# Patient Record
Sex: MALE | URBAN - METROPOLITAN AREA
[De-identification: names, ages, dates, MRNs, and addresses within clinical notes are randomized per-mention and may not be internally consistent; named-entity substitution may affect disease eponyms.]

---

## 2020-11-20 ENCOUNTER — APPOINTMENT (RX ONLY)
Dept: URBAN - METROPOLITAN AREA CLINIC 153 | Facility: CLINIC | Age: 50
Setting detail: DERMATOLOGY
End: 2020-11-20

## 2020-11-20 DIAGNOSIS — L95.8 OTHER VASCULITIS LIMITED TO THE SKIN: ICD-10-CM

## 2020-11-20 PROBLEM — L30.9 DERMATITIS, UNSPECIFIED: Status: ACTIVE | Noted: 2020-11-20

## 2020-11-20 PROCEDURE — ? ORDER TESTS

## 2020-11-20 PROCEDURE — ? PRESCRIPTION

## 2020-11-20 PROCEDURE — 11102 TANGNTL BX SKIN SINGLE LES: CPT

## 2020-11-20 PROCEDURE — ? BIOPSY BY SHAVE METHOD

## 2020-11-20 PROCEDURE — 11103 TANGNTL BX SKIN EA SEP/ADDL: CPT

## 2020-11-20 RX ORDER — PREDNISONE 20 MG/1
TABLET ORAL
Qty: 21 | Refills: 0 | Status: ERX | COMMUNITY
Start: 2020-11-20

## 2020-11-20 RX ADMIN — PREDNISONE: 20 TABLET ORAL at 00:00

## 2020-11-20 ASSESSMENT — LOCATION SIMPLE DESCRIPTION DERM
LOCATION SIMPLE: LEFT HAND
LOCATION SIMPLE: RIGHT HAND

## 2020-11-20 ASSESSMENT — LOCATION DETAILED DESCRIPTION DERM
LOCATION DETAILED: RIGHT RADIAL DORSAL HAND
LOCATION DETAILED: LEFT ULNAR DORSAL HAND

## 2020-11-20 ASSESSMENT — LOCATION ZONE DERM: LOCATION ZONE: HAND

## 2020-11-20 NOTE — PROCEDURE: BIOPSY BY SHAVE METHOD
Hide Additional Size Dimension?: No
Biopsy Type: DIF
Type Of Destruction Used: Curettage
X Size Of Lesion In Cm: 0
Information: Selecting Yes will display possible errors in your note based on the variables you have selected. This validation is only offered as a suggestion for you. PLEASE NOTE THAT THE VALIDATION TEXT WILL BE REMOVED WHEN YOU FINALIZE YOUR NOTE. IF YOU WANT TO FAX A PRELIMINARY NOTE YOU WILL NEED TO TOGGLE THIS TO 'NO' IF YOU DO NOT WANT IT IN YOUR FAXED NOTE.
Electrodesiccation And Curettage Text: The wound bed was treated with electrodesiccation and curettage after the biopsy was performed.
Silver Nitrate Text: The wound bed was treated with silver nitrate after the biopsy was performed.
Billing Type: Third-Party Bill
Cryotherapy Text: The wound bed was treated with cryotherapy after the biopsy was performed.
Biopsy Method: Dermablade
Anesthesia Volume In Cc: 0.5
Hemostasis: Ferric chloride
Depth Of Biopsy: dermis
Wound Care: Vaseline
Electrodesiccation Text: The wound bed was treated with electrodesiccation after the biopsy was performed.
Anesthesia Type: 1% lidocaine with epinephrine and a 1:10 solution of 8.4% sodium bicarbonate
Curettage Text: The wound bed was treated with curettage after the biopsy was performed.
Notification Instructions: Patient will be notified of biopsy results. However, patient instructed to call the office if not contacted within 2 weeks.
Post-Care Instructions: I reviewed with the patient in detail post-care instructions. Patient is to keep the biopsy site dry overnight, and then apply bacitracin twice daily until healed. Patient may apply hydrogen peroxide soaks to remove any crusting.
Consent: Written consent was obtained and risks were reviewed including but not limited to scarring, infection, bleeding, scabbing, incomplete removal, nerve damage and allergy to anesthesia.
Was A Bandage Applied: Yes
Dressing: bandage
Detail Level: Detailed
Biopsy Type: H and E

## 2020-11-30 ENCOUNTER — APPOINTMENT (RX ONLY)
Dept: URBAN - METROPOLITAN AREA CLINIC 153 | Facility: CLINIC | Age: 50
Setting detail: DERMATOLOGY
End: 2020-11-30

## 2020-11-30 VITALS — TEMPERATURE: 97.3 F

## 2020-11-30 DIAGNOSIS — L95.8 OTHER VASCULITIS LIMITED TO THE SKIN: ICD-10-CM

## 2020-11-30 PROCEDURE — ? ORDER TESTS

## 2020-11-30 PROCEDURE — ? ADDITIONAL NOTES

## 2020-11-30 PROCEDURE — ? PRESCRIPTION

## 2020-11-30 PROCEDURE — 99214 OFFICE O/P EST MOD 30 MIN: CPT

## 2020-11-30 PROCEDURE — ? COUNSELING

## 2020-11-30 RX ORDER — DAPSONE 25 MG/1
TABLET ORAL
Qty: 60 | Refills: 0 | COMMUNITY
Start: 2020-11-30

## 2020-11-30 RX ORDER — PREDNISONE 20 MG/1
TABLET ORAL
Qty: 14 | Refills: 0

## 2020-11-30 RX ADMIN — DAPSONE: 25 TABLET ORAL at 00:00

## 2020-11-30 ASSESSMENT — LOCATION DETAILED DESCRIPTION DERM
LOCATION DETAILED: LEFT RADIAL DORSAL HAND
LOCATION DETAILED: RIGHT ULNAR DORSAL HAND

## 2020-11-30 ASSESSMENT — LOCATION ZONE DERM: LOCATION ZONE: HAND

## 2020-11-30 ASSESSMENT — LOCATION SIMPLE DESCRIPTION DERM
LOCATION SIMPLE: RIGHT HAND
LOCATION SIMPLE: LEFT HAND

## 2020-11-30 NOTE — PROCEDURE: ADDITIONAL NOTES
Additional Notes: Pt will continue prednisone 40mg AM.\\nPt will start dapsone 50mg BID until office calls him with bloodwork results.
Detail Level: Simple

## 2020-12-07 ENCOUNTER — APPOINTMENT (RX ONLY)
Dept: URBAN - METROPOLITAN AREA CLINIC 153 | Facility: CLINIC | Age: 50
Setting detail: DERMATOLOGY
End: 2020-12-07

## 2020-12-07 VITALS — TEMPERATURE: 97.1 F

## 2020-12-07 DIAGNOSIS — L95.8 OTHER VASCULITIS LIMITED TO THE SKIN: ICD-10-CM

## 2020-12-07 PROCEDURE — ? ORDER TESTS

## 2020-12-07 PROCEDURE — ? ADDITIONAL NOTES

## 2020-12-07 PROCEDURE — ? COUNSELING

## 2020-12-07 PROCEDURE — 99212 OFFICE O/P EST SF 10 MIN: CPT

## 2020-12-07 PROCEDURE — ? PRESCRIPTION

## 2020-12-07 RX ORDER — PREDNISONE 20 MG/1
TABLET ORAL
Qty: 20 | Refills: 0 | Status: ERX

## 2020-12-07 ASSESSMENT — LOCATION DETAILED DESCRIPTION DERM
LOCATION DETAILED: RIGHT ULNAR DORSAL HAND
LOCATION DETAILED: LEFT RADIAL DORSAL HAND

## 2020-12-07 ASSESSMENT — LOCATION SIMPLE DESCRIPTION DERM
LOCATION SIMPLE: RIGHT HAND
LOCATION SIMPLE: LEFT HAND

## 2020-12-07 ASSESSMENT — LOCATION ZONE DERM: LOCATION ZONE: HAND

## 2020-12-14 ENCOUNTER — APPOINTMENT (RX ONLY)
Dept: URBAN - METROPOLITAN AREA CLINIC 153 | Facility: CLINIC | Age: 50
Setting detail: DERMATOLOGY
End: 2020-12-14

## 2020-12-14 ENCOUNTER — RX ONLY (OUTPATIENT)
Age: 50
Setting detail: RX ONLY
End: 2020-12-14

## 2020-12-14 VITALS — TEMPERATURE: 97.7 F

## 2020-12-14 DIAGNOSIS — L95.8 OTHER VASCULITIS LIMITED TO THE SKIN: ICD-10-CM

## 2020-12-14 PROCEDURE — 99213 OFFICE O/P EST LOW 20 MIN: CPT

## 2020-12-14 PROCEDURE — ? COUNSELING

## 2020-12-14 PROCEDURE — ? ADDITIONAL NOTES

## 2020-12-14 RX ORDER — PREDNISONE 20 MG/1
TABLET ORAL
Qty: 20 | Refills: 0 | Status: ERX

## 2020-12-14 ASSESSMENT — LOCATION SIMPLE DESCRIPTION DERM
LOCATION SIMPLE: LEFT PRETIBIAL REGION
LOCATION SIMPLE: RIGHT PRETIBIAL REGION

## 2020-12-14 ASSESSMENT — LOCATION DETAILED DESCRIPTION DERM
LOCATION DETAILED: LEFT DISTAL PRETIBIAL REGION
LOCATION DETAILED: RIGHT DISTAL PRETIBIAL REGION

## 2020-12-14 ASSESSMENT — LOCATION ZONE DERM: LOCATION ZONE: LEG

## 2020-12-14 NOTE — PROCEDURE: ADDITIONAL NOTES
Detail Level: Simple
Additional Notes: Patient was provided with all the paper work and a referral for a nephrologist to make a appointment.\\nPatient was recommended to continue taking the prescribed tablets as instructed.

## 2021-11-24 ENCOUNTER — OFFICE VISIT (OUTPATIENT)
Dept: URBAN - METROPOLITAN AREA CLINIC 89 | Facility: CLINIC | Age: 51
End: 2021-11-24
Payer: COMMERCIAL

## 2021-11-24 DIAGNOSIS — H40.023 OPEN ANGLE WITH BORDERLINE FINDINGS, HIGH RISK, BILATERAL: Primary | ICD-10-CM

## 2021-11-24 PROCEDURE — 92083 EXTENDED VISUAL FIELD XM: CPT | Performed by: OPTOMETRIST

## 2021-11-24 PROCEDURE — 92012 INTRM OPH EXAM EST PATIENT: CPT | Performed by: OPTOMETRIST

## 2021-11-24 PROCEDURE — 76514 ECHO EXAM OF EYE THICKNESS: CPT | Performed by: OPTOMETRIST

## 2021-11-24 PROCEDURE — 92133 CPTRZD OPH DX IMG PST SGM ON: CPT | Performed by: OPTOMETRIST

## 2021-11-24 RX ORDER — ORAL SEMAGLUTIDE 14 MG/1
14 MG TABLET ORAL AS DIRECTED
Refills: 0 | Status: ACTIVE
Start: 2021-11-24

## 2021-11-24 RX ORDER — OMEPRAZOLE 20 MG/1
20 MG TABLET, DELAYED RELEASE ORAL
Refills: 0 | Status: ACTIVE
Start: 2021-11-24

## 2021-11-24 ASSESSMENT — INTRAOCULAR PRESSURE
OS: 15
OD: 15

## 2021-11-24 NOTE — IMPRESSION/PLAN
Impression: Open angle with borderline findings, high risk, bilateral: H40.023. Plan: Visual field, OCT Nerve and pachymetry performed and reviewed today. Consistent with high risk glaucoma suspect. Right eye shows inferior thinning of the optic nerve. 

Plan to repeat RNFL testing at annual dilated DM examination in July 2022

## 2022-01-21 ENCOUNTER — OFFICE VISIT (OUTPATIENT)
Dept: URBAN - METROPOLITAN AREA CLINIC 89 | Facility: CLINIC | Age: 52
End: 2022-01-21
Payer: COMMERCIAL

## 2022-01-21 DIAGNOSIS — H11.153 PINGUECULA, BILATERAL: ICD-10-CM

## 2022-01-21 DIAGNOSIS — H52.13 MYOPIA, BILATERAL: ICD-10-CM

## 2022-01-21 DIAGNOSIS — H25.13 AGE-RELATED NUCLEAR CATARACT, BILATERAL: ICD-10-CM

## 2022-01-21 DIAGNOSIS — E11.3293 DIABETES MELLITUS TYPE 2 WITH MILD NON-PROLIFERATIVE RETINOPATHY WITHOUT MACULAR EDEMA, BILATERAL: Primary | ICD-10-CM

## 2022-01-21 ASSESSMENT — INTRAOCULAR PRESSURE
OS: 15
OD: 15

## 2022-01-21 NOTE — IMPRESSION/PLAN
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 Plan: Diabetes mellitus with mild nonproliferative diabetic retinopathy. Discussed that DM and diabetic retinopathy are the leading cause of preventable vision loss in American adults. Stressed the need for proper blood sugar control and correlation of A1c and diabetic eye disease. RTC 1 year repeat dilated examination.

## 2022-08-21 ENCOUNTER — EMERGENCY (EMERGENCY)
Facility: HOSPITAL | Age: 52
LOS: 1 days | Discharge: HOME | End: 2022-08-21
Attending: EMERGENCY MEDICINE | Admitting: EMERGENCY MEDICINE

## 2022-08-21 VITALS
SYSTOLIC BLOOD PRESSURE: 100 MMHG | HEART RATE: 100 BPM | TEMPERATURE: 97 F | DIASTOLIC BLOOD PRESSURE: 64 MMHG | OXYGEN SATURATION: 95 % | RESPIRATION RATE: 17 BRPM

## 2022-08-21 VITALS
TEMPERATURE: 97 F | RESPIRATION RATE: 18 BRPM | HEART RATE: 111 BPM | SYSTOLIC BLOOD PRESSURE: 112 MMHG | OXYGEN SATURATION: 95 % | DIASTOLIC BLOOD PRESSURE: 54 MMHG

## 2022-08-21 PROCEDURE — 70450 CT HEAD/BRAIN W/O DYE: CPT | Mod: 26,MA

## 2022-08-21 PROCEDURE — 72125 CT NECK SPINE W/O DYE: CPT | Mod: 26,MA

## 2022-08-21 PROCEDURE — 99285 EMERGENCY DEPT VISIT HI MDM: CPT

## 2022-08-21 NOTE — ED PROVIDER NOTE - PATIENT PORTAL LINK FT
You can access the FollowMyHealth Patient Portal offered by [facility] by registering at the following website: [email or web address]. By joining [facility]’s FollowMyHealth portal, you will also be able to view your health information using other applications (apps) compatible with our system.

## 2022-08-21 NOTE — ED ADULT NURSE NOTE - OBJECTIVE STATEMENT
pt admits to drinking "too much".  not awake and alert. speaking freely in full sentences.  has abrasions to knees and side of the head.  seen by dr. hatfield. for cat scan and dispo.  mother at beside.

## 2022-08-21 NOTE — ED PROVIDER NOTE - OBJECTIVE STATEMENT
52 y/o M BIB EMS for intoxication.  Per EMS, pt found lying on the ground outside his house naked.  Noted to have superficial abrasions to bilateral knees and R forehead.  No reported LOC.  Pt endorses drinking a lot at a party tonight.  He does not recall anything else.  Per mother at bedside, she drove them home after the party, but the pt fell asleep in the car so she left him in the car when they got home.  After going inside, mother was then woken up by EMS when they came to get the pt.  Unknown who called 911.  No antiplatelet or anticoagulant use.  Pt with no complaints at this time.

## 2022-08-21 NOTE — ED PROVIDER NOTE - PROGRESS NOTE DETAILS
Duglas WILSON: Pt is clinically sober and mother is at bedside, will drive them home.  CT neg for traumatic injury.  Will dc home.

## 2022-08-21 NOTE — ED ADULT TRIAGE NOTE - CHIEF COMPLAINT QUOTE
intox    pt admits to having "a lot to drink". was driven home by mother who couldn't get him out of the car.  was found on street next to car [place].  has abrasions to knees.  denies pain.  pt awake and alert. denies pmhx

## 2022-08-21 NOTE — ED PROVIDER NOTE - NSFOLLOWUPINSTRUCTIONS_ED_ALL_ED_FT
Drink plenty of fluids.  You can take ibuprofen 600mg every 6 hours or Tylenol 650mg every 4 hours as needed for pain or fever.  Follow-up with your PMD as needed.  Return to the emergency department for any new or worsening symptoms.

## 2022-08-21 NOTE — ED PROVIDER NOTE - TOBACCO USE
Airway  Performed by: Clay Mccormack MD  Authorized by: Clay Mccormack MD     Final Airway Type:  Supraglottic airway  Final Supraglottic Airway:  Air-Q  SGA Size*:  4.5  Airway Seal Pressure (cm H2O):  20  Attempts*:  1   Patient Identified, Procedure confirmed, Emergency equipment available and Safety protocols followed  Location:  OR  Urgency:  Elective  Difficult Airway: No    Indications for Airway Management:  Anesthesia  Spontaneous Ventilation: absent    Sedation Level:  Anesthetized  Mask Difficulty Assessment:  0 - not attempted  Performed By:  Anesthesiologist  Anesthesiologist:  Clay Mccormack MD        
Nerve Block    Date/Time: 3/25/2021 12:05 PM  Performed by: Clay Mccormack MD  Authorized by: Clay Mccormack MD     Block Type :  Femoral adductor canal  Laterality:  Right  Patient Location:  Pre-op  Indication: post-op pain management at surgeon's request    Surgeon:  Dr. Steele  patient identified, IV checked, risks and benefits discussed, surgical consent, monitors and equipment checked, pre-op evaluation and timeout performed    Patient Position:  Supine  Prep:  Chlorhexidine gluconate (CHG)  Max Sterile Barrier Technique:  Hand Washing, Cap/Mask and Sterile gloves  Monitoring:  Continuous pulse oximetry, EKG and blood pressure  Injection Technique:  Single-shot  Procedures: ultrasound guided    Needle Gauge:  22 G  Needle Length:  8 cm  Needle Localization:  Ultrasound guidance and anatomical landmarks   in-plane  Physical status during block:  Sedated  Injection Assessment:  No paresthesia on injection, no resistance to injection, negative aspiration for heme, incremental injection and local visualized surrounding nerve on ultrasound  Patient Condition:  Tolerated well, no immediate complications  Paresthesia Pain:  None  Heart Rate Change: No    Slowly Injected: Yes    Performed By:  Anesthesiologist  Anesthesiologist:  Clay Mccormack MD  Start Time:  3/25/2021 12:00 PM   Right adductor canal block for post-operative pain control.  After risks/benefits discussed, block was done without issue.  Good spread of local anesthetic surrounding saphenous nerve and ultrasound images saved.            
Never smoker

## 2022-08-21 NOTE — ED PROVIDER NOTE - CLINICAL SUMMARY MEDICAL DECISION MAKING FREE TEXT BOX
50 y/o M intoxicated with unwitnessed fall.  Superficial abrasions noted, no active complaints.  Will obtain CT head given trauma and unwitnessed fall in an intoxicated pt, reassess for sobriety.

## 2023-06-02 ENCOUNTER — OFFICE VISIT (OUTPATIENT)
Dept: URBAN - METROPOLITAN AREA CLINIC 89 | Facility: CLINIC | Age: 53
End: 2023-06-02
Payer: COMMERCIAL

## 2023-06-02 DIAGNOSIS — H40.023 OPEN ANGLE WITH BORDERLINE FINDINGS, HIGH RISK, BILATERAL: ICD-10-CM

## 2023-06-02 DIAGNOSIS — H25.13 AGE-RELATED NUCLEAR CATARACT, BILATERAL: ICD-10-CM

## 2023-06-02 DIAGNOSIS — E11.3293 DIABETES MELLITUS TYPE 2 WITH MILD NON-PROLIFERATIVE RETINOPATHY WITHOUT MACULAR EDEMA, BILATERAL: Primary | ICD-10-CM

## 2023-06-02 DIAGNOSIS — H52.13 MYOPIA, BILATERAL: ICD-10-CM

## 2023-06-02 PROCEDURE — 92014 COMPRE OPH EXAM EST PT 1/>: CPT | Performed by: OPTOMETRIST

## 2023-06-02 PROCEDURE — 92250 FUNDUS PHOTOGRAPHY W/I&R: CPT | Performed by: OPTOMETRIST

## 2023-06-02 ASSESSMENT — INTRAOCULAR PRESSURE
OS: 14
OD: 16

## 2023-06-02 NOTE — IMPRESSION/PLAN
Impression: Diabetes mellitus Type 2 with mild non-proliferative retinopathy without macular edema, bilateral: Q16.7692. Plan: Baseline photos taken and reviewed. Consistent with Diabetes mellitus with moderate nonproliferative diabetic retinopathy. Discussed that DM and diabetic retinopathy are the leading cause of preventable vision loss in American adults. Stressed the need for proper blood sugar control and correlation of A1c and diabetic eye disease. RTC 1 year repeat dilated examination.

## 2023-06-02 NOTE — IMPRESSION/PLAN
Impression: Open angle with borderline findings, high risk, bilateral: H40.023. Plan: Optic nerve photos taken and reviewed. Monitor annually.

## 2024-01-19 ENCOUNTER — INPATIENT (INPATIENT)
Facility: HOSPITAL | Age: 54
LOS: 3 days | Discharge: HOME | DRG: 433 | End: 2024-01-23
Attending: STUDENT IN AN ORGANIZED HEALTH CARE EDUCATION/TRAINING PROGRAM | Admitting: STUDENT IN AN ORGANIZED HEALTH CARE EDUCATION/TRAINING PROGRAM
Payer: COMMERCIAL

## 2024-01-19 VITALS
HEART RATE: 120 BPM | TEMPERATURE: 99 F | OXYGEN SATURATION: 94 % | HEIGHT: 74 IN | DIASTOLIC BLOOD PRESSURE: 68 MMHG | RESPIRATION RATE: 18 BRPM | WEIGHT: 205.03 LBS | SYSTOLIC BLOOD PRESSURE: 100 MMHG

## 2024-01-19 DIAGNOSIS — F10.239 ALCOHOL DEPENDENCE WITH WITHDRAWAL, UNSPECIFIED: ICD-10-CM

## 2024-01-19 DIAGNOSIS — Z29.9 ENCOUNTER FOR PROPHYLACTIC MEASURES, UNSPECIFIED: ICD-10-CM

## 2024-01-19 DIAGNOSIS — E87.8 OTHER DISORDERS OF ELECTROLYTE AND FLUID BALANCE, NOT ELSEWHERE CLASSIFIED: ICD-10-CM

## 2024-01-19 DIAGNOSIS — R00.0 TACHYCARDIA, UNSPECIFIED: ICD-10-CM

## 2024-01-19 PROBLEM — Z78.9 OTHER SPECIFIED HEALTH STATUS: Chronic | Status: ACTIVE | Noted: 2022-08-21

## 2024-01-19 LAB
ALBUMIN SERPL ELPH-MCNC: 3.7 G/DL — SIGNIFICANT CHANGE UP (ref 3.3–5)
ALP SERPL-CCNC: 95 U/L — SIGNIFICANT CHANGE UP (ref 40–120)
ALT FLD-CCNC: 28 U/L — SIGNIFICANT CHANGE UP (ref 10–45)
AMMONIA BLD-MCNC: 36 UMOL/L — SIGNIFICANT CHANGE UP (ref 11–55)
ANION GAP SERPL CALC-SCNC: 13 MMOL/L — SIGNIFICANT CHANGE UP (ref 5–17)
ANION GAP SERPL CALC-SCNC: 16 MMOL/L — SIGNIFICANT CHANGE UP (ref 5–17)
APTT BLD: 44.4 SEC — HIGH (ref 24.5–35.6)
AST SERPL-CCNC: 150 U/L — HIGH (ref 10–40)
BASE EXCESS BLDV CALC-SCNC: 1.5 MMOL/L — SIGNIFICANT CHANGE UP (ref -2–3)
BASOPHILS # BLD AUTO: 0 K/UL — SIGNIFICANT CHANGE UP (ref 0–0.2)
BASOPHILS NFR BLD AUTO: 0 % — SIGNIFICANT CHANGE UP (ref 0–2)
BILIRUB SERPL-MCNC: 4.5 MG/DL — HIGH (ref 0.2–1.2)
BUN SERPL-MCNC: 8 MG/DL — SIGNIFICANT CHANGE UP (ref 7–23)
BUN SERPL-MCNC: 9 MG/DL — SIGNIFICANT CHANGE UP (ref 7–23)
CA-I SERPL-SCNC: 0.99 MMOL/L — LOW (ref 1.15–1.33)
CALCIUM SERPL-MCNC: 8.2 MG/DL — LOW (ref 8.4–10.5)
CALCIUM SERPL-MCNC: 8.8 MG/DL — SIGNIFICANT CHANGE UP (ref 8.4–10.5)
CHLORIDE BLDV-SCNC: 99 MMOL/L — SIGNIFICANT CHANGE UP (ref 96–108)
CHLORIDE SERPL-SCNC: 100 MMOL/L — SIGNIFICANT CHANGE UP (ref 96–108)
CHLORIDE SERPL-SCNC: 97 MMOL/L — SIGNIFICANT CHANGE UP (ref 96–108)
CO2 BLDV-SCNC: 26 MMOL/L — SIGNIFICANT CHANGE UP (ref 22–26)
CO2 SERPL-SCNC: 21 MMOL/L — LOW (ref 22–31)
CO2 SERPL-SCNC: 23 MMOL/L — SIGNIFICANT CHANGE UP (ref 22–31)
CREAT SERPL-MCNC: 0.64 MG/DL — SIGNIFICANT CHANGE UP (ref 0.5–1.3)
CREAT SERPL-MCNC: 0.69 MG/DL — SIGNIFICANT CHANGE UP (ref 0.5–1.3)
EGFR: 111 ML/MIN/1.73M2 — SIGNIFICANT CHANGE UP
EGFR: 113 ML/MIN/1.73M2 — SIGNIFICANT CHANGE UP
EOSINOPHIL # BLD AUTO: 0.18 K/UL — SIGNIFICANT CHANGE UP (ref 0–0.5)
EOSINOPHIL NFR BLD AUTO: 2.6 % — SIGNIFICANT CHANGE UP (ref 0–6)
ETHANOL SERPL-MCNC: <10 MG/DL — SIGNIFICANT CHANGE UP (ref 0–10)
FLUAV AG NPH QL: SIGNIFICANT CHANGE UP
FLUBV AG NPH QL: SIGNIFICANT CHANGE UP
GAS PNL BLDV: 130 MMOL/L — LOW (ref 136–145)
GAS PNL BLDV: SIGNIFICANT CHANGE UP
GLUCOSE BLDV-MCNC: 111 MG/DL — HIGH (ref 70–99)
GLUCOSE SERPL-MCNC: 105 MG/DL — HIGH (ref 70–99)
GLUCOSE SERPL-MCNC: 109 MG/DL — HIGH (ref 70–99)
HCO3 BLDV-SCNC: 25 MMOL/L — SIGNIFICANT CHANGE UP (ref 22–29)
HCT VFR BLD CALC: 35.7 % — LOW (ref 39–50)
HCT VFR BLDA CALC: 37 % — LOW (ref 39–51)
HGB BLD CALC-MCNC: 12.3 G/DL — LOW (ref 12.6–17.4)
HGB BLD-MCNC: 12.1 G/DL — LOW (ref 13–17)
INR BLD: 2.19 RATIO — HIGH (ref 0.85–1.18)
LACTATE BLDV-MCNC: 2 MMOL/L — SIGNIFICANT CHANGE UP (ref 0.5–2)
LIDOCAIN IGE QN: 46 U/L — SIGNIFICANT CHANGE UP (ref 7–60)
LYMPHOCYTES # BLD AUTO: 0.86 K/UL — LOW (ref 1–3.3)
LYMPHOCYTES # BLD AUTO: 12.8 % — LOW (ref 13–44)
MANUAL SMEAR VERIFICATION: SIGNIFICANT CHANGE UP
MCHC RBC-ENTMCNC: 33.6 PG — SIGNIFICANT CHANGE UP (ref 27–34)
MCHC RBC-ENTMCNC: 33.9 GM/DL — SIGNIFICANT CHANGE UP (ref 32–36)
MCV RBC AUTO: 99.2 FL — SIGNIFICANT CHANGE UP (ref 80–100)
MONOCYTES # BLD AUTO: 0.63 K/UL — SIGNIFICANT CHANGE UP (ref 0–0.9)
MONOCYTES NFR BLD AUTO: 9.4 % — SIGNIFICANT CHANGE UP (ref 2–14)
NEUTROPHILS # BLD AUTO: 5.08 K/UL — SIGNIFICANT CHANGE UP (ref 1.8–7.4)
NEUTROPHILS NFR BLD AUTO: 75.2 % — SIGNIFICANT CHANGE UP (ref 43–77)
PCO2 BLDV: 33 MMHG — LOW (ref 42–55)
PH BLDV: 7.48 — HIGH (ref 7.32–7.43)
PLAT MORPH BLD: NORMAL — SIGNIFICANT CHANGE UP
PLATELET # BLD AUTO: 50 K/UL — LOW (ref 150–400)
PO2 BLDV: 46 MMHG — HIGH (ref 25–45)
POTASSIUM BLDV-SCNC: 7.1 MMOL/L — CRITICAL HIGH (ref 3.5–5.1)
POTASSIUM SERPL-MCNC: 3.1 MMOL/L — LOW (ref 3.5–5.3)
POTASSIUM SERPL-MCNC: 3.6 MMOL/L — SIGNIFICANT CHANGE UP (ref 3.5–5.3)
POTASSIUM SERPL-SCNC: 3.1 MMOL/L — LOW (ref 3.5–5.3)
POTASSIUM SERPL-SCNC: 3.6 MMOL/L — SIGNIFICANT CHANGE UP (ref 3.5–5.3)
PROT SERPL-MCNC: 9.2 G/DL — HIGH (ref 6–8.3)
PROTHROM AB SERPL-ACNC: 23.5 SEC — HIGH (ref 9.5–13)
RBC # BLD: 3.6 M/UL — LOW (ref 4.2–5.8)
RBC # FLD: 14.1 % — SIGNIFICANT CHANGE UP (ref 10.3–14.5)
RBC BLD AUTO: SIGNIFICANT CHANGE UP
RSV RNA NPH QL NAA+NON-PROBE: SIGNIFICANT CHANGE UP
SAO2 % BLDV: 76.5 % — SIGNIFICANT CHANGE UP (ref 67–88)
SARS-COV-2 RNA SPEC QL NAA+PROBE: SIGNIFICANT CHANGE UP
SODIUM SERPL-SCNC: 134 MMOL/L — LOW (ref 135–145)
SODIUM SERPL-SCNC: 136 MMOL/L — SIGNIFICANT CHANGE UP (ref 135–145)
TROPONIN T, HIGH SENSITIVITY RESULT: 14 NG/L — SIGNIFICANT CHANGE UP (ref 0–51)
WBC # BLD: 6.75 K/UL — SIGNIFICANT CHANGE UP (ref 3.8–10.5)
WBC # FLD AUTO: 6.75 K/UL — SIGNIFICANT CHANGE UP (ref 3.8–10.5)

## 2024-01-19 PROCEDURE — 71045 X-RAY EXAM CHEST 1 VIEW: CPT | Mod: 26

## 2024-01-19 PROCEDURE — 72125 CT NECK SPINE W/O DYE: CPT | Mod: 26,MA

## 2024-01-19 PROCEDURE — 70450 CT HEAD/BRAIN W/O DYE: CPT | Mod: 26,MA

## 2024-01-19 PROCEDURE — 99223 1ST HOSP IP/OBS HIGH 75: CPT

## 2024-01-19 PROCEDURE — 99285 EMERGENCY DEPT VISIT HI MDM: CPT

## 2024-01-19 RX ORDER — LANOLIN ALCOHOL/MO/W.PET/CERES
3 CREAM (GRAM) TOPICAL AT BEDTIME
Refills: 0 | Status: DISCONTINUED | OUTPATIENT
Start: 2024-01-19 | End: 2024-01-23

## 2024-01-19 RX ORDER — THIAMINE MONONITRATE (VIT B1) 100 MG
100 TABLET ORAL ONCE
Refills: 0 | Status: DISCONTINUED | OUTPATIENT
Start: 2024-01-19 | End: 2024-01-19

## 2024-01-19 RX ORDER — THIAMINE MONONITRATE (VIT B1) 100 MG
100 TABLET ORAL ONCE
Refills: 0 | Status: COMPLETED | OUTPATIENT
Start: 2024-01-19 | End: 2024-01-19

## 2024-01-19 RX ORDER — SODIUM CHLORIDE 9 MG/ML
250 INJECTION INTRAMUSCULAR; INTRAVENOUS; SUBCUTANEOUS ONCE
Refills: 0 | Status: COMPLETED | OUTPATIENT
Start: 2024-01-19 | End: 2024-01-19

## 2024-01-19 RX ORDER — FOLIC ACID 0.8 MG
1 TABLET ORAL DAILY
Refills: 0 | Status: DISCONTINUED | OUTPATIENT
Start: 2024-01-19 | End: 2024-01-23

## 2024-01-19 RX ORDER — SODIUM CHLORIDE 9 MG/ML
1000 INJECTION INTRAMUSCULAR; INTRAVENOUS; SUBCUTANEOUS ONCE
Refills: 0 | Status: COMPLETED | OUTPATIENT
Start: 2024-01-19 | End: 2024-01-19

## 2024-01-19 RX ORDER — POTASSIUM CHLORIDE 20 MEQ
40 PACKET (EA) ORAL ONCE
Refills: 0 | Status: COMPLETED | OUTPATIENT
Start: 2024-01-19 | End: 2024-01-19

## 2024-01-19 RX ORDER — POTASSIUM CHLORIDE 20 MEQ
10 PACKET (EA) ORAL
Refills: 0 | Status: COMPLETED | OUTPATIENT
Start: 2024-01-19 | End: 2024-01-19

## 2024-01-19 RX ORDER — ACETAMINOPHEN 500 MG
650 TABLET ORAL EVERY 6 HOURS
Refills: 0 | Status: DISCONTINUED | OUTPATIENT
Start: 2024-01-19 | End: 2024-01-23

## 2024-01-19 RX ADMIN — Medication 1 MILLIGRAM(S): at 18:07

## 2024-01-19 RX ADMIN — SODIUM CHLORIDE 500 MILLILITER(S): 9 INJECTION INTRAMUSCULAR; INTRAVENOUS; SUBCUTANEOUS at 19:14

## 2024-01-19 RX ADMIN — Medication 4 MILLIGRAM(S): at 13:27

## 2024-01-19 RX ADMIN — Medication 2 MILLIGRAM(S): at 12:07

## 2024-01-19 RX ADMIN — Medication 100 MILLIEQUIVALENT(S): at 21:30

## 2024-01-19 RX ADMIN — SODIUM CHLORIDE 1000 MILLILITER(S): 9 INJECTION INTRAMUSCULAR; INTRAVENOUS; SUBCUTANEOUS at 12:03

## 2024-01-19 RX ADMIN — Medication 100 MILLIEQUIVALENT(S): at 19:39

## 2024-01-19 RX ADMIN — Medication 40 MILLIEQUIVALENT(S): at 19:39

## 2024-01-19 RX ADMIN — Medication 100 MILLIGRAM(S): at 18:09

## 2024-01-19 RX ADMIN — Medication 100 MILLIEQUIVALENT(S): at 22:36

## 2024-01-19 NOTE — ED ADULT NURSE REASSESSMENT NOTE - NS ED NURSE REASSESS COMMENT FT1
Received report from HINA Patino. Pt AOx4. Pt  bpm. 250 mL NS bolus to be administered per day shift ACP. ED RN to contact ACP for further interventions. Pt admitted, ED RN to contact admit RN for report to bed.
ACP Alf made aware of HR, no further ED RN interventions. Pt admitted, pending transport to pt bed

## 2024-01-19 NOTE — H&P ADULT - ASSESSMENT
52 yo with h/o alcohol use disorder p/w two days of tremors, anxiety, nausea and difficulty ambulating with last drink 2 weeks ago, found to have thrombocytopenia, INR 2.2, TBili 4.5, transaminitis, electrolyte abnormalities, ammonia 36, consistent with alcohol withdrawal c/b severe alcoholic hepatitis and/or cirrhosis.

## 2024-01-19 NOTE — H&P ADULT - NSHPLABSRESULTS_GEN_ALL_CORE
12.1   6.75  )-----------( 50       ( 19 Jan 2024 12:07 )             35.7     01-19    136  |  100  |  9   ----------------------------<  105<H>  3.1<L>   |  23  |  0.64    Ca    8.2<L>      19 Jan 2024 15:32    TPro  9.2<H>  /  Alb  3.7  /  TBili  4.5<H>  /  DBili  x   /  AST  150<H>  /  ALT  28  /  AlkPhos  95  01-19          LIVER FUNCTIONS - ( 19 Jan 2024 12:07 )  Alb: 3.7 g/dL / Pro: 9.2 g/dL / ALK PHOS: 95 U/L / ALT: 28 U/L / AST: 150 U/L / GGT: x             Liver panel trend:  TBili 4.5   /      /   ALT 28   /   AlkP 95   /   Tptn 9.2   /   Alb 3.7    /   DBili --      01-19      PT/INR - ( 19 Jan 2024 12:07 )   PT: 23.5 sec;   INR: 2.19 ratio         PTT - ( 19 Jan 2024 12:07 )  PTT:44.4 sec    Urinalysis Basic - ( 19 Jan 2024 15:32 )    Color: x / Appearance: x / SG: x / pH: x  Gluc: 105 mg/dL / Ketone: x  / Bili: x / Urobili: x   Blood: x / Protein: x / Nitrite: x   Leuk Esterase: x / RBC: x / WBC x   Sq Epi: x / Non Sq Epi: x / Bacteria: x      PT/INR - ( 19 Jan 2024 12:07 )   PT: 23.5 sec;   INR: 2.19 ratio         PTT - ( 19 Jan 2024 12:07 )  PTT:44.4 sec    RADIOLOGY      IMPRESSION:    Head CT: No CT evidence of acute intracranial hemorrhage.    C-spine CT:  No acute fracture.

## 2024-01-19 NOTE — ED PROVIDER NOTE - CONSIDERATION OF ADMISSION OBSERVATION
Patient with shakes and high CiWA score and is concerning for significant withdrawal Consideration of Admission/Observation

## 2024-01-19 NOTE — H&P ADULT - ATTENDING COMMENTS
53M with no reported PMH, significant Etoh abuse p/w tremors - long standing drinking history, ~1 bottle of wine for the past 14 years, decided to quite 2 weeks ago after noticing weight loss. He esperineced tremors, nausea/vomiting, diaphoresis in the first several days after quitting, but these symptoms then resolved. Returns to ED now for fevers, chills, b/l UE tremors and cough/congestions that started yesterday. pt states he has not had any Etoh in past 2 weeks. Of note, pt also notes intermittent epistaxis and bruising on his body and mild b/l LE edema since returning from [facility] 3 weeks ago. Also noted diarrhea, states went 20x in past 2 days, no blood/black stool.    No CP, SOB, no abd pain, no leg pain.   Exam notable for tachycardia, bruising on his L hand, no significant LE edema, no tresors noted, strength intact, non focal exam.   Labs notable for low platelets, elevated INR, T bili 4.6, normal Cr, normal lactate, CTH/C spine WNL.     A/P - Concerning for new cirrhosis based on labs; unclear etiology of tremors on admission as last drink 2 weeks ago and pt is out of the window for withdrawal; ?viral infection.  - check US liver to eval for cirrhosis   - pending US, will consult hepatology   - check hepatitis serologies   - check full RVP   - keep on symptom triggered ativan for now, although low suspicion for active withdrawal   - MELD labs daily

## 2024-01-19 NOTE — H&P ADULT - NSICDXPASTMEDICALHX_GEN_ALL_CORE_FT
HPRP f/u: 
Telephone attempt to contact patient for Health Promotion and Risk Prevention. Left discreet message on voicemail with this CC contact information. Will follow for one month for transitions of care needs. Next outreach is 4/24/19 for discussion f/u - SOB and Resolve Epsiode
PAST MEDICAL HISTORY:  No pertinent past medical history

## 2024-01-19 NOTE — ED ADULT NURSE NOTE - OBJECTIVE STATEMENT
53y Male AOX4 presents to the ED c/o tremors. Pt endorses hx ETOH abuse. 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 Endorses increased tremors, unstable gait, decreased appetite and seeing "rain coming down whenever I look at lights". States he has never been in withdrawal before, denies hx seizures. Pt also reports recent travel back from [facility] x3 weeks ago, did not walk around when on the airplane, endorses improved swelling in b/l ankles, but also noticed atraumatic bruising on both knees. Placed on cardiac monitor - sinus tach. Denies N/V, fever/chills, SOB, chest pain. Spontaneous/unlabored respirations, speaking in full sentences. Side rails up, bed in lowest position, oriented to call bell, safety maintained.

## 2024-01-19 NOTE — ED PROVIDER NOTE - OBJECTIVE STATEMENT
50-year-old male chief complaint tremor patient notes that he has been having tremors for the last couple of days worsening today.  Last drink of alcohol was approximately 2 weeks ago.  Patient states he resumed his sodium doing told to come to the hospital for further evaluation.  Patient states he is having slight cough slight nausea slight vomiting in conjunction with never having been through alcohol withdrawal in the past.  Patient cannot quantify this time when she would drink per day.

## 2024-01-19 NOTE — H&P ADULT - HISTORY OF PRESENT ILLNESS
54 yo M with h/o excessive alcohol use presenting with two days of tremors associated with nausea, emesis and difficulty ambulating. Patient states that his last drink of alcohol was 2 weeks ago. Currently endorsing cough, nausea, emesis as well as bruising on his hands and legs. He states he has never had alcohol withdrawal symptoms previously but does note that he has had issues with alcohol use in the past.    In the ED, patient was found to be vitally stable except tachycardia to 110's, tremulous and anxious with bruising on hands and altered mentation. Labs were notable for platelets 50, INR 2.2, TBili 4.5, AST//28, K 3.1, ammonia 36, negative lipase, iCa 0.99, alcohol level <10, RVP negative, CXR clear, and CT head and C-spine negative for acute pathology. CIWA scoring 6, 7. 52 yo M with h/o excessive alcohol use presenting with two days of tremors associated with nausea, emesis, sweating, and difficulty ambulating. Patient states that his last drink of alcohol was 2 weeks ago while in [place]. Currently endorsing cough, nausea, emesis as well as bruising on his hands and legs. He states he has had alcohol withdrawal symptoms previously but does note that he has had issues with alcohol use in the past.    In the ED, patient was found to be vitally stable except tachycardia to 110's, tremulous and anxious with bruising on hands and altered mentation. Labs were notable for platelets 50, INR 2.2, TBili 4.5, AST//28, K 3.1, ammonia 36, negative lipase, iCa 0.99, alcohol level <10, RVP negative, CXR clear, and CT head and C-spine negative for acute pathology. CIWA scoring 6, 7. 54 yo M with h/o excessive alcohol use presenting with two days of tremors associated with nausea, emesis, sweating, and difficulty ambulating. Patient states that his last drink of alcohol was 2 weeks ago while in [place]. Currently endorsing cough, nausea, as well as bruising on his hands and legs. He states he has had alcohol withdrawal symptoms previously but does note that he has had issues with alcohol use in the past.    In the ED, patient was found to be vitally stable except tachycardia to 110's, tremulous and anxious with bruising on hands and altered mentation. Labs were notable for platelets 50, INR 2.2, TBili 4.5, AST//28, K 3.1, ammonia 36, negative lipase, iCa 0.99, alcohol level <10, RVP negative, CXR clear, and CT head and C-spine negative for acute pathology. CIWA scoring 6, 7.

## 2024-01-19 NOTE — ED ADULT NURSE NOTE - NSFALLRISKINTERV_ED_ALL_ED
Assistance OOB with selected safe patient handling equipment if applicable/Assistance with ambulation/Communicate fall risk and risk factors to all staff, patient, and family/Encourage patient to sit up slowly, dangle for a short time, stand at bedside before walking/Monitor gait and stability/Monitor for mental status changes and reorient to person, place, and time, as needed/Orthostatic vital signs/Provide visual cue: yellow wristband, yellow gown, etc/Reinforce activity limits and safety measures with patient and family/Toileting schedule using arm’s reach rule for commode and bathroom/Use of alarms - bed, stretcher, chair and/or video monitoring/Call bell, personal items and telephone in reach/Instruct patient to call for assistance before getting out of bed/chair/stretcher/Non-slip footwear applied when patient is off stretcher/[place] to call system/Physically safe environment - no spills, clutter or unnecessary equipment/Purposeful Proactive Rounding/Room/bathroom lighting operational, light cord in reach

## 2024-01-19 NOTE — ED PROVIDER NOTE - CLINICAL SUMMARY MEDICAL DECISION MAKING FREE TEXT BOX
given hx and px pt is presenting such as etoh withdrawal but timeline does not fit, pt may also be confused as to when last drink was, but seems reliable, pt will be treated as such though and also obtain ammonia 2/2 possible hyperammonemia being etiology as well pt ultimtely will be admitted given hx and px pt is presenting such as etoh withdrawal but timeline does not fit, pt may also be confused as to when last drink was, but seems reliable, pt will be treated as such though and also obtain ammonia 2/2 possible hyperammonemia being etiology as well pt ultimtely will be admitted  Rita: Patient is a 53-year-old who presents with bruising on his hands and his leg.  Patient has noticed that he also is starting to have some difficulty walking.  Patient states that he stopped drinking 2 weeks ago and is quite shaky because of that.  Patient admits to having difficulty with alcohol prior to that.  3 weeks ago patient came back from [facility] and noted to have bilateral lower extremity edema.  The concern here is that his liver could be failing and he could be in withdrawal.  Think bilateral lower extremity edema unlikely to be DVT or PE.  Patient will get Ativan and labs and further treatment. Lab studies ordered, independently reviewed and acted on as appropriate.

## 2024-01-19 NOTE — H&P ADULT - PROBLEM SELECTOR PLAN 2
- ical 1, K+ 3.1 on admission - replete  - check Mg+  - thiamine 100mg IV, folic acid 1mg qd  - monitor and replete lytes as needed

## 2024-01-19 NOTE — H&P ADULT - NSHPPHYSICALEXAM_GEN_ALL_CORE
Vital Signs Last 24 Hrs  T(C): 37 (19 Jan 2024 13:25), Max: 37.1 (19 Jan 2024 11:22)  T(F): 98.6 (19 Jan 2024 13:25), Max: 98.8 (19 Jan 2024 11:22)  HR: 112 (19 Jan 2024 13:25) (108 - 120)  BP: 123/79 (19 Jan 2024 13:25) (100/68 - 148/96)  BP(mean): 93 (19 Jan 2024 13:25) (93 - 108)  RR: 22 (19 Jan 2024 13:25) (18 - 22)  SpO2: 96% (19 Jan 2024 13:25) (94% - 98%)    Parameters below as of 19 Jan 2024 13:25  Patient On (Oxygen Delivery Method): room air    PHYSICAL EXAM:  GENERAL: NAD, lying in bed comfortably  HEAD:  Atraumatic, Normocephalic  EYES: EOMI, PERRLA, conjunctiva and sclera clear  ENT: Moist mucous membranes  NECK: Supple, No JVD  CHEST/LUNG: Clear to auscultation bilaterally; No rales, rhonchi, wheezing, or rubs. Unlabored respirations  HEART: Regular rate and rhythm; No murmurs, rubs, or gallops  ABDOMEN: Bowel sounds present; Soft, Nontender, Nondistended. ?hepatomegaly, no ascites  EXTREMITIES:  2+ Peripheral Pulses, brisk capillary refill. No clubbing, cyanosis, or ?edema  NERVOUS SYSTEM:  Alert & Oriented X1-2, speech clear. No deficits   MSK: FROM all 4 extremities, full and equal strength  SKIN: No rashes or lesions Vital Signs Last 24 Hrs  T(C): 37 (19 Jan 2024 13:25), Max: 37.1 (19 Jan 2024 11:22)  T(F): 98.6 (19 Jan 2024 13:25), Max: 98.8 (19 Jan 2024 11:22)  HR: 112 (19 Jan 2024 13:25) (108 - 120)  BP: 123/79 (19 Jan 2024 13:25) (100/68 - 148/96)  BP(mean): 93 (19 Jan 2024 13:25) (93 - 108)  RR: 22 (19 Jan 2024 13:25) (18 - 22)  SpO2: 96% (19 Jan 2024 13:25) (94% - 98%)    Parameters below as of 19 Jan 2024 13:25  Patient On (Oxygen Delivery Method): room air    PHYSICAL EXAM:  GENERAL: NAD, lying in bed comfortably  HEAD:  Atraumatic, Normocephalic  EYES: EOMI, PERRLA, conjunctiva and sclera clear  ENT: Moist mucous membranes  NECK: Supple, No JVD  CHEST/LUNG: Clear to auscultation bilaterally; No rales, rhonchi, wheezing, or rubs. Unlabored respirations  HEART: Regular rate and rhythm; No murmurs, rubs, or gallops  ABDOMEN: Bowel sounds present; Soft, Nontender, Nondistended. + hepatomegaly, no ascites  EXTREMITIES:  2+ Peripheral Pulses, brisk capillary refill. No clubbing, cyanosis, or, no pitting edema.  NERVOUS SYSTEM:  Alert & Oriented X3, speech clear. + asterixis.  MSK: FROM all 4 extremities, full and equal strength  SKIN: No rashes or lesions Vital Signs Last 24 Hrs  T(C): 37 (19 Jan 2024 13:25), Max: 37.1 (19 Jan 2024 11:22)  T(F): 98.6 (19 Jan 2024 13:25), Max: 98.8 (19 Jan 2024 11:22)  HR: 112 (19 Jan 2024 13:25) (108 - 120)  BP: 123/79 (19 Jan 2024 13:25) (100/68 - 148/96)  BP(mean): 93 (19 Jan 2024 13:25) (93 - 108)  RR: 22 (19 Jan 2024 13:25) (18 - 22)  SpO2: 96% (19 Jan 2024 13:25) (94% - 98%)    Parameters below as of 19 Jan 2024 13:25  Patient On (Oxygen Delivery Method): room air    PHYSICAL EXAM:  GENERAL: NAD, lying in bed comfortably  HEAD:  Atraumatic, Normocephalic  EYES: EOMI, PERRLA, conjunctiva and sclera clear  ENT: Moist mucous membranes  NECK: Supple, No JVD  CHEST/LUNG: Clear to auscultation bilaterally; No rales, rhonchi, wheezing, or rubs. Unlabored respirations  HEART: Regular rate and rhythm; No murmurs, rubs, or gallops  ABDOMEN: Bowel sounds present; Soft, Nontender, Nondistended. + hepatomegaly, no ascites  EXTREMITIES:  2+ Peripheral Pulses, brisk capillary refill. No clubbing, cyanosis, or, no pitting edema.  NERVOUS SYSTEM:  Alert & Oriented X3, speech clear. + asterixis.  MSK: FROM all 4 extremities, full and equal strength  SKIN:

## 2024-01-19 NOTE — ED PROVIDER NOTE - PHYSICAL EXAMINATION
GENERAL: Awake, alert, NAD  HEENT Mild tongue fasciculations   LUNGS: CTAB, no wheezes or crackles   CARDIAC: RRR, no m/r/g  ABDOMEN: Soft, , non tender, non distended, no rebound, no guarding  BACK: No midline spinal tenderness, no CVA tenderness  EXT: No edema, no calf tenderness, 2+ DP pulses bilaterally, .  NEURO: A&Ox3. Moving all extremities. tremor w/ hand extension and at rest   SKIN: Warm and dry. No rash.  PSYCH: Normal affect.

## 2024-01-19 NOTE — H&P ADULT - PROBLEM SELECTOR PLAN 1
Presenting with tremor, anxiety, altered mentation, tachycardia c/w withdrawal although last drink however was 2 weeks ago; also with bruising on hands and legs c/w fall (endorsed difficulty ambulting) but with negative CT H and c-spine  - platelets 50, INR 2.2, TBili 4.5, AST//28 - c/f alcoholic hepatitis possibly w/chronic cirrhosis  - Maddrey's DF 59.7 - severe alcoholic hepatitis and would benefit from glucocorticoids  - AOx1-2 on exam, no jaundice, hepatomegaly, edema; ammonia 36, less concerning for HE  - never had alcohol withdrawal in the past  > symptom-triggered CIWA   > Ativan 2mg q1h PRN for symptom triggered for CIWA >= 8  > check hepatitis panel  > obtain RUQUS  > consider hepatology consult Presenting with tremor, anxiety, altered mentation, tachycardia c/w withdrawal although last drink however was 2 weeks ago; also with bruising on hands and legs c/w fall (endorsed difficulty ambulting) but with negative CT H and c-spine  - platelets 50, INR 2.2, TBili 4.5, Cr 0.64, AST//28 - c/f severe alcoholic hepatitis possibly w/chronic cirrhosis  - Maddrey's DF 59.7 - severe alcoholic hepatitis and would benefit from glucocorticoids  - MELD 24  - AOx1-2 on exam, no jaundice, hepatomegaly, edema; ammonia 36, less concerning for HE  - never had alcohol withdrawal in the past  > symptom-triggered CIWA   > Ativan 2mg q1h PRN for symptom triggered for CIWA >= 8  > check hepatitis panel  > obtain RUQUS  > consider hepatology consult Presenting with tremor, anxiety, altered mentation, tachycardia c/w withdrawal although last drink however was 2 weeks ago; also with bruising on hands and legs c/w fall (endorsed difficulty ambulating) but with negative CT H and c-spine  - platelets 50, INR 2.2, TBili 4.5, Cr 0.64, AST//28 - c/f severe alcoholic hepatitis possibly w/chronic cirrhosis  - Maddrey's DF 59.7 - severe alcoholic hepatitis and would benefit from glucocorticoids  - on admission, exam with hepatomegaly, asterixis, altered sleep/wake cycle, mild confusion (AOx3), but no jaundice  - MELD 24  - ammonia 36, less concerning for HE  - has had alcohol withdrawal in the past  > symptom-triggered CIWA   > Ativan 2mg q1h PRN for symptom triggered CIWA >= 8  > check hepatitis panel  > obtain RUQUS  > consider hepatology consult  > 30-40 mg/d of prednisolone for 30 days, followed by a rapid taper and withdrawal over the subsequent 2-4 weeks Presenting with tremor, anxiety, altered mentation, tachycardia c/w withdrawal although last drink however was 2 weeks ago; also with bruising on hands and legs c/w fall (endorsed difficulty ambulating) but with negative CT H and c-spine  - platelets 50, INR 2.2, TBili 4.5, Cr 0.64, AST//28 - c/f severe alcoholic hepatitis possibly w/chronic cirrhosis  - Maddrey's DF 59.7 - severe alcoholic hepatitis and would benefit from glucocorticoids  - on admission, exam with hepatomegaly, asterixis, altered sleep/wake cycle, mild confusion (AOx3), but no jaundice  - MELD 24  - ammonia 36, less concerning for HE  - has had alcohol withdrawal in the past  > symptom-triggered CIWA; Ativan 2mg q1h PRN for symptom triggered CIWA >= 8  > check hepatitis serologies  > obtain RUQUS  > hepatology consult  > if infection negative, [id number] mg/d of prednisolone for 30 days, followed by a rapid taper and withdrawal over the subsequent 2-4 weeks  > trend CBC, LFT's, PT/INR, MELD labs qd

## 2024-01-20 LAB
ALBUMIN SERPL ELPH-MCNC: 3.1 G/DL — LOW (ref 3.3–5)
ALP SERPL-CCNC: 85 U/L — SIGNIFICANT CHANGE UP (ref 40–120)
ALT FLD-CCNC: 23 U/L — SIGNIFICANT CHANGE UP (ref 10–45)
ANION GAP SERPL CALC-SCNC: 11 MMOL/L — SIGNIFICANT CHANGE UP (ref 5–17)
ANION GAP SERPL CALC-SCNC: 13 MMOL/L — SIGNIFICANT CHANGE UP (ref 5–17)
ANION GAP SERPL CALC-SCNC: 15 MMOL/L — SIGNIFICANT CHANGE UP (ref 5–17)
AST SERPL-CCNC: 114 U/L — HIGH (ref 10–40)
BASOPHILS # BLD AUTO: 0.06 K/UL — SIGNIFICANT CHANGE UP (ref 0–0.2)
BASOPHILS NFR BLD AUTO: 0.8 % — SIGNIFICANT CHANGE UP (ref 0–2)
BILIRUB SERPL-MCNC: 3.3 MG/DL — HIGH (ref 0.2–1.2)
BUN SERPL-MCNC: 11 MG/DL — SIGNIFICANT CHANGE UP (ref 7–23)
BUN SERPL-MCNC: 12 MG/DL — SIGNIFICANT CHANGE UP (ref 7–23)
BUN SERPL-MCNC: 13 MG/DL — SIGNIFICANT CHANGE UP (ref 7–23)
CALCIUM SERPL-MCNC: 8 MG/DL — LOW (ref 8.4–10.5)
CALCIUM SERPL-MCNC: 8.1 MG/DL — LOW (ref 8.4–10.5)
CALCIUM SERPL-MCNC: 8.2 MG/DL — LOW (ref 8.4–10.5)
CHLORIDE SERPL-SCNC: 101 MMOL/L — SIGNIFICANT CHANGE UP (ref 96–108)
CHLORIDE SERPL-SCNC: 101 MMOL/L — SIGNIFICANT CHANGE UP (ref 96–108)
CHLORIDE SERPL-SCNC: 96 MMOL/L — SIGNIFICANT CHANGE UP (ref 96–108)
CO2 SERPL-SCNC: 21 MMOL/L — LOW (ref 22–31)
CO2 SERPL-SCNC: 22 MMOL/L — SIGNIFICANT CHANGE UP (ref 22–31)
CO2 SERPL-SCNC: 22 MMOL/L — SIGNIFICANT CHANGE UP (ref 22–31)
CREAT SERPL-MCNC: 0.69 MG/DL — SIGNIFICANT CHANGE UP (ref 0.5–1.3)
CREAT SERPL-MCNC: 0.77 MG/DL — SIGNIFICANT CHANGE UP (ref 0.5–1.3)
CREAT SERPL-MCNC: 0.8 MG/DL — SIGNIFICANT CHANGE UP (ref 0.5–1.3)
EGFR: 106 ML/MIN/1.73M2 — SIGNIFICANT CHANGE UP
EGFR: 107 ML/MIN/1.73M2 — SIGNIFICANT CHANGE UP
EGFR: 111 ML/MIN/1.73M2 — SIGNIFICANT CHANGE UP
EOSINOPHIL # BLD AUTO: 0.06 K/UL — SIGNIFICANT CHANGE UP (ref 0–0.5)
EOSINOPHIL NFR BLD AUTO: 0.8 % — SIGNIFICANT CHANGE UP (ref 0–6)
GLUCOSE SERPL-MCNC: 116 MG/DL — HIGH (ref 70–99)
GLUCOSE SERPL-MCNC: 157 MG/DL — HIGH (ref 70–99)
GLUCOSE SERPL-MCNC: 235 MG/DL — HIGH (ref 70–99)
HAV IGM SER-ACNC: SIGNIFICANT CHANGE UP
HBV CORE AB SER-ACNC: SIGNIFICANT CHANGE UP
HBV CORE IGM SER-ACNC: SIGNIFICANT CHANGE UP
HBV SURFACE AG SER-ACNC: SIGNIFICANT CHANGE UP
HCT VFR BLD CALC: 32.4 % — LOW (ref 39–50)
HCV AB S/CO SERPL IA: 0.19 S/CO — SIGNIFICANT CHANGE UP (ref 0–0.99)
HCV AB SERPL-IMP: SIGNIFICANT CHANGE UP
HGB BLD-MCNC: 10.9 G/DL — LOW (ref 13–17)
IMM GRANULOCYTES NFR BLD AUTO: 0.4 % — SIGNIFICANT CHANGE UP (ref 0–0.9)
INR BLD: 2.37 RATIO — HIGH (ref 0.85–1.18)
LYMPHOCYTES # BLD AUTO: 1.75 K/UL — SIGNIFICANT CHANGE UP (ref 1–3.3)
LYMPHOCYTES # BLD AUTO: 23.6 % — SIGNIFICANT CHANGE UP (ref 13–44)
MAGNESIUM SERPL-MCNC: 1 MG/DL — CRITICAL LOW (ref 1.6–2.6)
MAGNESIUM SERPL-MCNC: 1.8 MG/DL — SIGNIFICANT CHANGE UP (ref 1.6–2.6)
MCHC RBC-ENTMCNC: 33.6 GM/DL — SIGNIFICANT CHANGE UP (ref 32–36)
MCHC RBC-ENTMCNC: 33.6 PG — SIGNIFICANT CHANGE UP (ref 27–34)
MCV RBC AUTO: 100 FL — SIGNIFICANT CHANGE UP (ref 80–100)
MELD SCORE WITH DIALYSIS: 34 POINTS — SIGNIFICANT CHANGE UP
MELD SCORE WITHOUT DIALYSIS: 22 POINTS — SIGNIFICANT CHANGE UP
MONOCYTES # BLD AUTO: 0.53 K/UL — SIGNIFICANT CHANGE UP (ref 0–0.9)
MONOCYTES NFR BLD AUTO: 7.2 % — SIGNIFICANT CHANGE UP (ref 2–14)
NEUTROPHILS # BLD AUTO: 4.97 K/UL — SIGNIFICANT CHANGE UP (ref 1.8–7.4)
NEUTROPHILS NFR BLD AUTO: 67.2 % — SIGNIFICANT CHANGE UP (ref 43–77)
NRBC # BLD: 0 /100 WBCS — SIGNIFICANT CHANGE UP (ref 0–0)
PHOSPHATE SERPL-MCNC: 1.4 MG/DL — LOW (ref 2.5–4.5)
PHOSPHATE SERPL-MCNC: 11.5 MG/DL — HIGH (ref 2.5–4.5)
PLATELET # BLD AUTO: 47 K/UL — LOW (ref 150–400)
POTASSIUM SERPL-MCNC: 3.4 MMOL/L — LOW (ref 3.5–5.3)
POTASSIUM SERPL-MCNC: 3.4 MMOL/L — LOW (ref 3.5–5.3)
POTASSIUM SERPL-MCNC: 3.6 MMOL/L — SIGNIFICANT CHANGE UP (ref 3.5–5.3)
POTASSIUM SERPL-SCNC: 3.4 MMOL/L — LOW (ref 3.5–5.3)
POTASSIUM SERPL-SCNC: 3.4 MMOL/L — LOW (ref 3.5–5.3)
POTASSIUM SERPL-SCNC: 3.6 MMOL/L — SIGNIFICANT CHANGE UP (ref 3.5–5.3)
PROT SERPL-MCNC: 8 G/DL — SIGNIFICANT CHANGE UP (ref 6–8.3)
PROTHROM AB SERPL-ACNC: 25.4 SEC — HIGH (ref 9.5–13)
RBC # BLD: 3.24 M/UL — LOW (ref 4.2–5.8)
RBC # FLD: 14.4 % — SIGNIFICANT CHANGE UP (ref 10.3–14.5)
SODIUM SERPL-SCNC: 133 MMOL/L — LOW (ref 135–145)
SODIUM SERPL-SCNC: 134 MMOL/L — LOW (ref 135–145)
SODIUM SERPL-SCNC: 135 MMOL/L — SIGNIFICANT CHANGE UP (ref 135–145)
WBC # BLD: 7.4 K/UL — SIGNIFICANT CHANGE UP (ref 3.8–10.5)
WBC # FLD AUTO: 7.4 K/UL — SIGNIFICANT CHANGE UP (ref 3.8–10.5)

## 2024-01-20 PROCEDURE — 99233 SBSQ HOSP IP/OBS HIGH 50: CPT

## 2024-01-20 PROCEDURE — 93970 EXTREMITY STUDY: CPT | Mod: 26

## 2024-01-20 RX ORDER — MAGNESIUM SULFATE 500 MG/ML
2 VIAL (ML) INJECTION ONCE
Refills: 0 | Status: COMPLETED | OUTPATIENT
Start: 2024-01-20 | End: 2024-01-20

## 2024-01-20 RX ORDER — POTASSIUM CHLORIDE 20 MEQ
40 PACKET (EA) ORAL ONCE
Refills: 0 | Status: COMPLETED | OUTPATIENT
Start: 2024-01-20 | End: 2024-01-20

## 2024-01-20 RX ADMIN — Medication 1 MILLIGRAM(S): at 12:24

## 2024-01-20 RX ADMIN — Medication 40 MILLIEQUIVALENT(S): at 06:57

## 2024-01-20 RX ADMIN — Medication 25 GRAM(S): at 12:23

## 2024-01-20 RX ADMIN — Medication 25 GRAM(S): at 06:08

## 2024-01-20 RX ADMIN — Medication 3 MILLIGRAM(S): at 21:05

## 2024-01-20 RX ADMIN — Medication 85 MILLIMOLE(S): at 09:18

## 2024-01-20 NOTE — PROVIDER CONTACT NOTE (CRITICAL VALUE NOTIFICATION) - ASSESSMENT
AOx4, pt able to verbalize needs. NSR on tele. Pt denies and headache, dizziness, shortness of breath, headache and chest pain. Pt verbalizes no pain, no acute distress noted.

## 2024-01-20 NOTE — PROGRESS NOTE ADULT - SUBJECTIVE AND OBJECTIVE BOX
***************************************************************  Hook (Ji-Cheng) [facility] PGY2  Internal Medicine   ***************************************************************    ZAIN BAZAN  53y  MRN: [id number]    Patient is a 53y old  Male who presents with a chief complaint of tremors (19 Jan 2024 16:53)      Subjective: no events ON. Denies fever, CP, SOB, abn pain, N/V, dysuria. Tolerating diet.      MEDICATIONS  (STANDING):  folic acid 1 milliGRAM(s) Oral daily    MEDICATIONS  (PRN):  acetaminophen     Tablet .. 650 milliGRAM(s) Oral every 6 hours PRN Temp greater or equal to 38C (100.4F), Mild Pain (1 - 3)  aluminum hydroxide/magnesium hydroxide/simethicone Suspension 30 milliLiter(s) Oral every 4 hours PRN Dyspepsia  LORazepam   Injectable 2 milliGRAM(s) IV Push every 1 hour PRN Symptom-triggered: each CIWA -Ar score 8 or GREATER  melatonin 3 milliGRAM(s) Oral at bedtime PRN Insomnia      Objective:    Vitals: Vital Signs Last 24 Hrs  T(C): 36.9 (01-20-24 @ 04:34), Max: 37.1 (01-19-24 @ 11:22)  T(F): 98.4 (01-20-24 @ 04:34), Max: 98.8 (01-19-24 @ 11:22)  HR: 110 (01-20-24 @ 04:34) (101 - 126)  BP: 106/64 (01-20-24 @ 04:34) (100/68 - 148/96)  BP(mean): 93 (01-19-24 @ 13:25) (93 - 108)  RR: 18 (01-20-24 @ 04:34) (18 - 22)  SpO2: 95% (01-20-24 @ 04:34) (94% - 99%)            I&O's Summary      PHYSICAL EXAM:  GENERAL: NAD  HEAD:  Atraumatic, Normocephalic  EYES: EOMI, conjunctiva and sclera clear  CHEST/LUNG: Clear to auscultation bilaterally; No rales, rhonchi, wheezing, or rubs  HEART: Regular rate and rhythm; No murmurs, rubs, or gallops  ABDOMEN: Soft, Nontender, Nondistended;   SKIN: No rashes or lesions  NERVOUS SYSTEM:  Alert & Oriented X3, no focal deficits    LABS:  01-20    135  |  101  |  11  ----------------------------<  157<H>  3.4<L>   |  21<L>  |  0.69  01-20    134<L>  |  101  |  13  ----------------------------<  116<H>  3.4<L>   |  22  |  0.77  01-19    136  |  100  |  9   ----------------------------<  105<H>  3.1<L>   |  23  |  0.64    Ca    8.0<L>      20 Jan 2024 05:27  Ca    8.2<L>      20 Jan 2024 01:24  Ca    8.2<L>      19 Jan 2024 15:32  Phos  1.4     01-20  Mg     1.0     01-20    TPro  8.0  /  Alb  3.1<L>  /  TBili  3.3<H>  /  DBili  x   /  AST  114<H>  /  ALT  23  /  AlkPhos  85  01-20  TPro  9.2<H>  /  Alb  3.7  /  TBili  4.5<H>  /  DBili  x   /  AST  150<H>  /  ALT  28  /  AlkPhos  95  01-19      PT/INR - ( 20 Jan 2024 05:27 )   PT: 25.4 sec;   INR: 2.37 ratio         PTT - ( 19 Jan 2024 12:07 )  PTT:44.4 sec              Urinalysis Basic - ( 20 Jan 2024 05:27 )    Color: x / Appearance: x / SG: x / pH: x  Gluc: 157 mg/dL / Ketone: x  / Bili: x / Urobili: x   Blood: x / Protein: x / Nitrite: x   Leuk Esterase: x / RBC: x / WBC x   Sq Epi: x / Non Sq Epi: x / Bacteria: x                              10.9   7.40  )-----------( 47       ( 20 Jan 2024 05:27 )             32.4                         12.1   6.75  )-----------( 50       ( 19 Jan 2024 12:07 )             35.7     CAPILLARY BLOOD GLUCOSE      POCT Blood Glucose.: 99 mg/dL (19 Jan 2024 11:31)      RADIOLOGY & ADDITIONAL TESTS:    Imaging Personally Reviewed:  [x ] YES  [ ] NO    Consultants involved in case:   Consultant(s) Notes Reviewed:  [ x] YES  [ ] NO:   Care Discussed with Consultants/Other Providers [x ] YES  [ ] NO         ***************************************************************  Hook (Ji-Cheng) [facility] PGY2  Internal Medicine   ***************************************************************    ZAIN BAZAN  53y  MRN: [id number]    Patient is a 53y old  Male who presents with a chief complaint of tremors (19 Jan 2024 16:53)      Subjective: no events ON. Denies fever, CP, SOB, abn pain, N/V, dysuria.     MEDICATIONS  (STANDING):  folic acid 1 milliGRAM(s) Oral daily    MEDICATIONS  (PRN):  acetaminophen     Tablet .. 650 milliGRAM(s) Oral every 6 hours PRN Temp greater or equal to 38C (100.4F), Mild Pain (1 - 3)  aluminum hydroxide/magnesium hydroxide/simethicone Suspension 30 milliLiter(s) Oral every 4 hours PRN Dyspepsia  LORazepam   Injectable 2 milliGRAM(s) IV Push every 1 hour PRN Symptom-triggered: each CIWA -Ar score 8 or GREATER  melatonin 3 milliGRAM(s) Oral at bedtime PRN Insomnia      Objective:    Vitals: Vital Signs Last 24 Hrs  T(C): 36.9 (01-20-24 @ 04:34), Max: 37.1 (01-19-24 @ 11:22)  T(F): 98.4 (01-20-24 @ 04:34), Max: 98.8 (01-19-24 @ 11:22)  HR: 110 (01-20-24 @ 04:34) (101 - 126)  BP: 106/64 (01-20-24 @ 04:34) (100/68 - 148/96)  BP(mean): 93 (01-19-24 @ 13:25) (93 - 108)  RR: 18 (01-20-24 @ 04:34) (18 - 22)  SpO2: 95% (01-20-24 @ 04:34) (94% - 99%)            I&O's Summary      PHYSICAL EXAM:  GENERAL: NAD  HEAD:  Atraumatic, Normocephalic  EYES: EOMI, conjunctiva and sclera clear.  CHEST/LUNG: Clear to auscultation bilaterally; No rales, rhonchi, wheezing, or rubs  HEART: Regular rate and rhythm; No murmurs, rubs, or gallops.  ABDOMEN: Soft, Nontender, Distended; Hepatomegaly.  SKIN: No rashes or lesions  NERVOUS SYSTEM:  Alert & Oriented X3, no focal deficits    LABS:  01-20    135  |  101  |  11  ----------------------------<  157<H>  3.4<L>   |  21<L>  |  0.69  01-20    134<L>  |  101  |  13  ----------------------------<  116<H>  3.4<L>   |  22  |  0.77  01-19    136  |  100  |  9   ----------------------------<  105<H>  3.1<L>   |  23  |  0.64    Ca    8.0<L>      20 Jan 2024 05:27  Ca    8.2<L>      20 Jan 2024 01:24  Ca    8.2<L>      19 Jan 2024 15:32  Phos  1.4     01-20  Mg     1.0     01-20    TPro  8.0  /  Alb  3.1<L>  /  TBili  3.3<H>  /  DBili  x   /  AST  114<H>  /  ALT  23  /  AlkPhos  85  01-20  TPro  9.2<H>  /  Alb  3.7  /  TBili  4.5<H>  /  DBili  x   /  AST  150<H>  /  ALT  28  /  AlkPhos  95  01-19      PT/INR - ( 20 Jan 2024 05:27 )   PT: 25.4 sec;   INR: 2.37 ratio         PTT - ( 19 Jan 2024 12:07 )  PTT:44.4 sec              Urinalysis Basic - ( 20 Jan 2024 05:27 )    Color: x / Appearance: x / SG: x / pH: x  Gluc: 157 mg/dL / Ketone: x  / Bili: x / Urobili: x   Blood: x / Protein: x / Nitrite: x   Leuk Esterase: x / RBC: x / WBC x   Sq Epi: x / Non Sq Epi: x / Bacteria: x                              10.9   7.40  )-----------( 47       ( 20 Jan 2024 05:27 )             32.4                         12.1   6.75  )-----------( 50       ( 19 Jan 2024 12:07 )             35.7     CAPILLARY BLOOD GLUCOSE      POCT Blood Glucose.: 99 mg/dL (19 Jan 2024 11:31)      RADIOLOGY & ADDITIONAL TESTS:    Imaging Personally Reviewed:  [x ] YES  [ ] NO    Consultants involved in case:   Consultant(s) Notes Reviewed:  [ x] YES  [ ] NO:   Care Discussed with Consultants/Other Providers [x ] YES  [ ] NO             ZAIN BAZAN  53y  MRN: [id number]    Patient is a 53y old  Male who presents with a chief complaint of tremors (19 Jan 2024 16:53)      Subjective: no events ON. Denies fever, CP, SOB, abn pain, N/V, dysuria.     MEDICATIONS  (STANDING):  folic acid 1 milliGRAM(s) Oral daily    MEDICATIONS  (PRN):  acetaminophen     Tablet .. 650 milliGRAM(s) Oral every 6 hours PRN Temp greater or equal to 38C (100.4F), Mild Pain (1 - 3)  aluminum hydroxide/magnesium hydroxide/simethicone Suspension 30 milliLiter(s) Oral every 4 hours PRN Dyspepsia  LORazepam   Injectable 2 milliGRAM(s) IV Push every 1 hour PRN Symptom-triggered: each CIWA -Ar score 8 or GREATER  melatonin 3 milliGRAM(s) Oral at bedtime PRN Insomnia      Objective:    Vitals: Vital Signs Last 24 Hrs  T(C): 36.9 (01-20-24 @ 04:34), Max: 37.1 (01-19-24 @ 11:22)  T(F): 98.4 (01-20-24 @ 04:34), Max: 98.8 (01-19-24 @ 11:22)  HR: 110 (01-20-24 @ 04:34) (101 - 126)  BP: 106/64 (01-20-24 @ 04:34) (100/68 - 148/96)  BP(mean): 93 (01-19-24 @ 13:25) (93 - 108)  RR: 18 (01-20-24 @ 04:34) (18 - 22)  SpO2: 95% (01-20-24 @ 04:34) (94% - 99%)            I&O's Summary      PHYSICAL EXAM:  GENERAL: NAD  HEAD:  Atraumatic, Normocephalic  EYES: EOMI, conjunctiva and sclera clear.  CHEST/LUNG: Clear to auscultation bilaterally; No rales, rhonchi, wheezing, or rubs  HEART: Regular rate and rhythm; No murmurs, rubs, or gallops.  ABDOMEN: Soft, Nontender, Distended; Hepatomegaly.  SKIN: No rashes or lesions  NERVOUS SYSTEM:  Alert & Oriented X3, no focal deficits    LABS:  01-20    135  |  101  |  11  ----------------------------<  157<H>  3.4<L>   |  21<L>  |  0.69  01-20    134<L>  |  101  |  13  ----------------------------<  116<H>  3.4<L>   |  22  |  0.77  01-19    136  |  100  |  9   ----------------------------<  105<H>  3.1<L>   |  23  |  0.64    Ca    8.0<L>      20 Jan 2024 05:27  Ca    8.2<L>      20 Jan 2024 01:24  Ca    8.2<L>      19 Jan 2024 15:32  Phos  1.4     01-20  Mg     1.0     01-20    TPro  8.0  /  Alb  3.1<L>  /  TBili  3.3<H>  /  DBili  x   /  AST  114<H>  /  ALT  23  /  AlkPhos  85  01-20  TPro  9.2<H>  /  Alb  3.7  /  TBili  4.5<H>  /  DBili  x   /  AST  150<H>  /  ALT  28  /  AlkPhos  95  01-19      PT/INR - ( 20 Jan 2024 05:27 )   PT: 25.4 sec;   INR: 2.37 ratio         PTT - ( 19 Jan 2024 12:07 )  PTT:44.4 sec              Urinalysis Basic - ( 20 Jan 2024 05:27 )    Color: x / Appearance: x / SG: x / pH: x  Gluc: 157 mg/dL / Ketone: x  / Bili: x / Urobili: x   Blood: x / Protein: x / Nitrite: x   Leuk Esterase: x / RBC: x / WBC x   Sq Epi: x / Non Sq Epi: x / Bacteria: x                              10.9   7.40  )-----------( 47       ( 20 Jan 2024 05:27 )             32.4                         12.1   6.75  )-----------( 50       ( 19 Jan 2024 12:07 )             35.7     CAPILLARY BLOOD GLUCOSE      POCT Blood Glucose.: 99 mg/dL (19 Jan 2024 11:31)      RADIOLOGY & ADDITIONAL TESTS:    Imaging Personally Reviewed:  [x ] YES  [ ] NO    Consultants involved in case:   Consultant(s) Notes Reviewed:  [ x] YES  [ ] NO:   Care Discussed with Consultants/Other Providers [x ] YES  [ ] NO

## 2024-01-20 NOTE — PROGRESS NOTE ADULT - PROBLEM SELECTOR PLAN 2
- ical 1, K+ 3.1 on admission - replete  - check Mg+  - thiamine 100mg IV, folic acid 1mg qd  - monitor and replete lytes as needed - low Ca, K, phosphorus on admission - replete  - replete Mg+ aggressively  - thiamine 100mg IV, folic acid 1mg qd  - monitor and replete lytes as needed

## 2024-01-20 NOTE — CONSULT NOTE ADULT - ASSESSMENT
Reji Ramos is a 53 year old male wit PMHx notable for excessive alcohol use presenting with two days of tremors found to have elevated LFTs c/f acute alc hep in the setting of possible ETOH mediated cirrhosis     #Acute Alc Hep   Hx notable for heavy ETOH use with labs suggestive of acute alc hep. MDF>32 although labs overall improving with supportive care. Please obtain broad infectious workup. If labs don;t improve may consider steroids vs. expedited LT evaluation if no improvement with the above.   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 Please obtain U/S with doppler and workup as noted below   -Volume: 1+ edema in legs b/l. Hold on diuretics for now   -Infection: see above   -Bleeding: no signs of any bleeding. Non-urgent EGD vs. NSBB   -HE: A/Ox3.  -Malnutiriton: please give IV Vitamin K 10 mg x 3 days     Recommendations:  -Please obtain RUQUS with doppler   -Please obtain Bcx, GI PCR, CXR, UA, and dx tap if ascites present   -Please give IV Vitamin K 10 mg x 3 days   -Please send HAV, HBVsAb, HBVsAg, HBVcAb, HCV Ab, HCV RNA for viral etiologies  -Please send alpha-1 anti-trypsin (phenotype), ceruloplasm, AFP  -Please send CRISTIANO, anti-mitochondrial antibody, anti-smooth muscle antibody, anti-liver kidney antibody, immunoglobulins (IgG, IgM, IgA quantitative) for autoimmune etiologies   -Please obtain PETH   -Thiamine/Folate/MVI  -CIWA per primary     All recommendations are tentative until note is attested by attending.

## 2024-01-20 NOTE — PROGRESS NOTE ADULT - ASSESSMENT
52 yo with h/o alcohol use disorder p/w two days of tremors, anxiety, nausea and difficulty ambulating with last drink 2 weeks ago, found to have thrombocytopenia, INR 2.2, TBili 4.5, transaminitis, electrolyte abnormalities, ammonia 36, consistent with alcohol withdrawal c/b severe alcoholic hepatitis and/or cirrhosis. 54 yo with h/o alcohol use disorder p/w two days of tremors, anxiety, nausea and difficulty ambulating with last drink 2 weeks ago, found to have thrombocytopenia, INR 2.2, TBili 4.5, transaminitis, electrolyte abnormalities, ammonia 36, consistent with alcohol withdrawal c/b severe alcoholic hepatitis and/or cirrhosis.

## 2024-01-20 NOTE — PROGRESS NOTE ADULT - PROBLEM SELECTOR PLAN 3
DVT: scds  Diet: regular  Dispo: pending clinical course DVT: SCDs  Diet: regular  Dispo: pending clinical course

## 2024-01-20 NOTE — PATIENT PROFILE ADULT - FALL HARM RISK - HARM RISK INTERVENTIONS
Assistance with ambulation/Assistance OOB with selected safe patient handling equipment/Communicate Risk of Fall with Harm to all staff/Discuss with provider need for PT consult/Monitor for mental status changes/Monitor gait and stability/Reinforce activity limits and safety measures with patient and family/Tailored Fall Risk Interventions/Toileting schedule using arm’s reach rule for commode and bathroom/Use of alarms - bed, chair and/or voice tab/Visual Cue: Yellow wristband and red socks/Bed in lowest position, wheels locked, appropriate side rails in place/Call bell, personal items and telephone in reach/Instruct patient to call for assistance before getting out of bed or chair/Non-slip footwear when patient is out of bed/[place] to call system/Physically safe environment - no spills, clutter or unnecessary equipment/Purposeful Proactive Rounding/Room/bathroom lighting operational, light cord in reach

## 2024-01-20 NOTE — PHYSICAL THERAPY INITIAL EVALUATION ADULT - ADDITIONAL COMMENTS
as per pt; PTA pt was living in an apartment + stairs (approx 10 steps to living floor) and was independent in all functional mobility and ADL's. no AD for gait.

## 2024-01-20 NOTE — CONSULT NOTE ADULT - SUBJECTIVE AND OBJECTIVE BOX
HPI:  Reji Ramos is a 53 year old male wit PMHx notable for excessive alcohol use presenting with two days of tremors found to have elevated LFTs c/f acute alc hep in the setting of possible ETOH mediated cirrhosis.     Patient with a hx of heavy ETOH use for many years. Previously a bartendeer but quit drinking around 2-3 weeks ago. He states that his last drink of alcohol was 2 weeks ago while in [place] while on vacation with a friend. No prior personal hx of any lvier disease or any ETOH mediating complications. Endorses withdrawls in the past but no prior admission related to the above.He currnetly presnts to the hospital with tremors x 3 days.  In the ED, patient was found to be vitally stable except tachycardia to 110's, tremulous and anxious with bruising on hands and altered mentation. Labs were notable for platelets 50, INR 2.2, TBili 4.5, AST//28, K 3.1, ammonia 36, negative lipase, iCa 0.99, alcohol level <10, RVP negative, CXR clear, and CT head and C-spine negative for acute pathology. CIWA scoring 6, 7.Currently feeling well with no ongoing fevers, chills, nausea, vomiting or any abdominal pan.     Allergies:  No Known Drug Allergies  Tomatoes (Unknown)      Home Medications:  None     Hospital Medications:  acetaminophen     Tablet .. 650 milliGRAM(s) Oral every 6 hours PRN  aluminum hydroxide/magnesium hydroxide/simethicone Suspension 30 milliLiter(s) Oral every 4 hours PRN  folic acid 1 milliGRAM(s) Oral daily  LORazepam   Injectable 2 milliGRAM(s) IV Push every 1 hour PRN  melatonin 3 milliGRAM(s) Oral at bedtime PRN      PMHX/PSHX:    AUD     Family history:  No fam hx of any liver disease  Social History:   Tob: Denies  EtOH: see above   Illicit Drugs: Denies      ROS: Complete and normal except as mentioned above    PHYSICAL EXAM:   GENERAL:  No acute distress  HEENT:  + scleral icterus   CHEST:  no respiratory distress  HEART:  Regular rate and rhythm  ABDOMEN:  Soft, non-tender, non-distended, normoactive bowel sounds  EXTREMITIES: Trace to 1+ edema b/l   NEURO:  Alert and oriented x 3, no asterixis    Vital Signs:  Vital Signs Last 24 Hrs  T(C): 37.2 (2024 11:38), Max: 37.2 (2024 11:38)  T(F): 98.9 (2024 11:38), Max: 98.9 (2024 11:38)  HR: 128 (2024 11:38) (95 - 128)  BP: 126/75 (2024 11:38) (106/64 - 126/75)  BP(mean): --  RR: 18 (:38) (18 - 19)  SpO2: 96% (2024 11:38) (95% - 99%)    Parameters below as of 2024 11:38  Patient On (Oxygen Delivery Method): room air      Daily     Daily Weight in k.2 (2024 04:34)    LABS:                        10.9   7.40  )-----------( 47       ( 2024 05:27 )             32.4     Mean Cell Volume: 100.0 fl (-24 @ 05:27)        133<L>  |  96  |  12  ----------------------------<  235<H>  3.6   |  22  |  0.80    Ca    8.1<L>      2024 14:28  Phos  11.5     -20  Mg     1.8         TPro  8.0  /  Alb  3.1<L>  /  TBili  3.3<H>  /  DBili  x   /  AST  114<H>  /  ALT  23  /  AlkPhos  85  01-20    LIVER FUNCTIONS - ( 2024 05:27 )  Alb: 3.1 g/dL / Pro: 8.0 g/dL / ALK PHOS: 85 U/L / ALT: 23 U/L / AST: 114 U/L / GGT: x           PT/INR - ( 2024 05:27 )   PT: 25.4 sec;   INR: 2.37 ratio         PTT - ( 2024 12:07 )  PTT:44.4 sec  Urinalysis Basic - ( 2024 14:28 )    Color: x / Appearance: x / SG: x / pH: x  Gluc: 235 mg/dL / Ketone: x  / Bili: x / Urobili: x   Blood: x / Protein: x / Nitrite: x   Leuk Esterase: x / RBC: x / WBC x   Sq Epi: x / Non Sq Epi: x / Bacteria: x                              10.9   7.40  )-----------( 47       ( 2024 05:27 )             32.4                         12.1   6.75  )-----------( 50       ( 2024 12:07 )             35.7       Imaging:  < from: CT Head No Cont (24 @ 13:11) >  IMPRESSION:    Head CT: No CT evidence of acute intracranial hemorrhage.    C-spine CT:  No acute fracture.    < end of copied text >

## 2024-01-20 NOTE — PROGRESS NOTE ADULT - PROBLEM SELECTOR PLAN 1
Presenting with tremor, anxiety, altered mentation, tachycardia c/w withdrawal although last drink however was 2 weeks ago; also with bruising on hands and legs c/w fall (endorsed difficulty ambulating) but with negative CT H and c-spine  - platelets 50, INR 2.2, TBili 4.5, Cr 0.64, AST//28 - c/f severe alcoholic hepatitis possibly w/chronic cirrhosis  - Maddrey's DF 59.7 - severe alcoholic hepatitis and would benefit from glucocorticoids  - on admission, exam with hepatomegaly, asterixis, altered sleep/wake cycle, mild confusion (AOx3), but no jaundice  - MELD 24  - ammonia 36, less concerning for HE  - has had alcohol withdrawal in the past  > symptom-triggered CIWA; Ativan 2mg q1h PRN for symptom triggered CIWA >= 8  > check hepatitis serologies  > obtain RUQUS  > hepatology consult  > if infection negative, [id number] mg/d of prednisolone for 30 days, followed by a rapid taper and withdrawal over the subsequent 2-4 weeks  > trend CBC, LFT's, PT/INR, MELD labs qd Presenting with tremor, anxiety, altered mentation, tachycardia c/w withdrawal although last drink however was 2 weeks ago; also with bruising on hands and legs c/w fall (endorsed difficulty ambulating) but with negative CT H and c-spine  - platelets 50, INR 2.2, TBili 4.5, Cr 0.64, AST//28 - c/f severe alcoholic hepatitis possibly w/chronic cirrhosis  - Maddrey's DF 59.7 - severe alcoholic hepatitis and would benefit from glucocorticoids  - on admission, exam with hepatomegaly, asterixis, altered sleep/wake cycle, mild confusion (AOx3), but no jaundice  - MELD 24  - ammonia 36, less concerning for HE  - has had alcohol withdrawal in the past  > symptom-triggered CIWA; Ativan 2mg q1h PRN for symptom triggered CIWA >= 8  > f/u hepatitis serologies  > obtain RUQUS  > hepatology consult - recs appreciated  > if infection negative, [id number] mg/d of prednisolone for 30 days, followed by a rapid taper and withdrawal over the subsequent 2-4 weeks  > trend CBC, LFT's, PT/INR, MELD labs qd

## 2024-01-20 NOTE — PHYSICAL THERAPY INITIAL EVALUATION ADULT - PERTINENT HX OF CURRENT PROBLEM, REHAB EVAL
52 yo M with h/o excessive alcohol use presenting with two days of tremors associated with nausea, emesis, sweating, and difficulty ambulating. Patient states that his last drink of alcohol was 2 weeks ago while in [place]. Currently endorsing cough, nausea, as well as bruising on his hands and legs. He states he has had alcohol withdrawal symptoms previously but does note that he has had issues with alcohol use in the past.    In the ED, patient was found to be vitally stable except tachycardia to 110's, tremulous and anxious with bruising on hands and altered mentation. Labs were notable for platelets 50, INR 2.2, TBili 4.5, AST//28, K 3.1, ammonia 36, negative lipase, iCa 0.99, alcohol level <10, RVP negative, CXR clear, and CT head and C-spine negative for acute pathology. CIWA scoring 6, 7.

## 2024-01-21 LAB
ALBUMIN SERPL ELPH-MCNC: 3.3 G/DL — SIGNIFICANT CHANGE UP (ref 3.3–5)
ALP SERPL-CCNC: 109 U/L — SIGNIFICANT CHANGE UP (ref 40–120)
ALT FLD-CCNC: 25 U/L — SIGNIFICANT CHANGE UP (ref 10–45)
ANION GAP SERPL CALC-SCNC: 12 MMOL/L — SIGNIFICANT CHANGE UP (ref 5–17)
AST SERPL-CCNC: 103 U/L — HIGH (ref 10–40)
BASOPHILS # BLD AUTO: 0.06 K/UL — SIGNIFICANT CHANGE UP (ref 0–0.2)
BASOPHILS NFR BLD AUTO: 0.8 % — SIGNIFICANT CHANGE UP (ref 0–2)
BILIRUB SERPL-MCNC: 2.6 MG/DL — HIGH (ref 0.2–1.2)
BUN SERPL-MCNC: 11 MG/DL — SIGNIFICANT CHANGE UP (ref 7–23)
CALCIUM SERPL-MCNC: 8.4 MG/DL — SIGNIFICANT CHANGE UP (ref 8.4–10.5)
CHLORIDE SERPL-SCNC: 102 MMOL/L — SIGNIFICANT CHANGE UP (ref 96–108)
CO2 SERPL-SCNC: 21 MMOL/L — LOW (ref 22–31)
CREAT SERPL-MCNC: 0.63 MG/DL — SIGNIFICANT CHANGE UP (ref 0.5–1.3)
EGFR: 114 ML/MIN/1.73M2 — SIGNIFICANT CHANGE UP
EOSINOPHIL # BLD AUTO: 0.15 K/UL — SIGNIFICANT CHANGE UP (ref 0–0.5)
EOSINOPHIL NFR BLD AUTO: 2 % — SIGNIFICANT CHANGE UP (ref 0–6)
GLUCOSE SERPL-MCNC: 98 MG/DL — SIGNIFICANT CHANGE UP (ref 70–99)
HCT VFR BLD CALC: 31.4 % — LOW (ref 39–50)
HGB BLD-MCNC: 10.5 G/DL — LOW (ref 13–17)
IMM GRANULOCYTES NFR BLD AUTO: 0.3 % — SIGNIFICANT CHANGE UP (ref 0–0.9)
INR BLD: 2.27 RATIO — HIGH (ref 0.85–1.18)
LYMPHOCYTES # BLD AUTO: 1.64 K/UL — SIGNIFICANT CHANGE UP (ref 1–3.3)
LYMPHOCYTES # BLD AUTO: 22.4 % — SIGNIFICANT CHANGE UP (ref 13–44)
MAGNESIUM SERPL-MCNC: 1.6 MG/DL — SIGNIFICANT CHANGE UP (ref 1.6–2.6)
MCHC RBC-ENTMCNC: 33.4 GM/DL — SIGNIFICANT CHANGE UP (ref 32–36)
MCHC RBC-ENTMCNC: 33.7 PG — SIGNIFICANT CHANGE UP (ref 27–34)
MCV RBC AUTO: 100.6 FL — HIGH (ref 80–100)
MONOCYTES # BLD AUTO: 0.67 K/UL — SIGNIFICANT CHANGE UP (ref 0–0.9)
MONOCYTES NFR BLD AUTO: 9.1 % — SIGNIFICANT CHANGE UP (ref 2–14)
NEUTROPHILS # BLD AUTO: 4.79 K/UL — SIGNIFICANT CHANGE UP (ref 1.8–7.4)
NEUTROPHILS NFR BLD AUTO: 65.4 % — SIGNIFICANT CHANGE UP (ref 43–77)
NRBC # BLD: 0 /100 WBCS — SIGNIFICANT CHANGE UP (ref 0–0)
PHOSPHATE SERPL-MCNC: 3.1 MG/DL — SIGNIFICANT CHANGE UP (ref 2.5–4.5)
PLATELET # BLD AUTO: 56 K/UL — LOW (ref 150–400)
POTASSIUM SERPL-MCNC: 3.5 MMOL/L — SIGNIFICANT CHANGE UP (ref 3.5–5.3)
POTASSIUM SERPL-SCNC: 3.5 MMOL/L — SIGNIFICANT CHANGE UP (ref 3.5–5.3)
PROT SERPL-MCNC: 8 G/DL — SIGNIFICANT CHANGE UP (ref 6–8.3)
PROTHROM AB SERPL-ACNC: 23.3 SEC — HIGH (ref 9.5–13)
RBC # BLD: 3.12 M/UL — LOW (ref 4.2–5.8)
RBC # FLD: 15.1 % — HIGH (ref 10.3–14.5)
SODIUM SERPL-SCNC: 135 MMOL/L — SIGNIFICANT CHANGE UP (ref 135–145)
WBC # BLD: 7.33 K/UL — SIGNIFICANT CHANGE UP (ref 3.8–10.5)
WBC # FLD AUTO: 7.33 K/UL — SIGNIFICANT CHANGE UP (ref 3.8–10.5)

## 2024-01-21 PROCEDURE — 99233 SBSQ HOSP IP/OBS HIGH 50: CPT

## 2024-01-21 RX ORDER — PREDNISOLONE 5 MG
30 TABLET ORAL DAILY
Refills: 0 | Status: DISCONTINUED | OUTPATIENT
Start: 2024-01-21 | End: 2024-01-22

## 2024-01-21 RX ADMIN — Medication 1 MILLIGRAM(S): at 11:49

## 2024-01-21 RX ADMIN — Medication 30 MILLIGRAM(S): at 13:09

## 2024-01-21 NOTE — PROGRESS NOTE ADULT - ASSESSMENT
54 yo with h/o alcohol use disorder p/w two days of tremors, anxiety, nausea and difficulty ambulating with last drink 2 weeks ago, found to have thrombocytopenia, INR 2.2, TBili 4.5, transaminitis, electrolyte abnormalities, ammonia 36, consistent with alcohol withdrawal c/b severe alcoholic hepatitis and/or cirrhosis.

## 2024-01-21 NOTE — PROGRESS NOTE ADULT - PROBLEM SELECTOR PLAN 1
Presenting with tremor, anxiety, altered mentation, tachycardia c/w withdrawal although last drink however was 2 weeks ago; also with bruising on hands and legs c/w fall (endorsed difficulty ambulating) but with negative CT H and c-spine  - platelets 50, INR 2.2, TBili 4.5, Cr 0.64, AST//28 - c/f severe alcoholic hepatitis possibly w/chronic cirrhosis  - Maddrey's DF 59.7 - severe alcoholic hepatitis and would benefit from glucocorticoids  - on admission, exam with hepatomegaly, asterixis, altered sleep/wake cycle, mild confusion (AOx3), but no jaundice  - MELD 24  - ammonia 36, less concerning for HE  - has had alcohol withdrawal in the past  > symptom-triggered CIWA; Ativan 2mg q1h PRN for symptom triggered CIWA >= 8  > f/u hepatitis serologies  > obtain RUQUS  > hepatology consult - recs appreciated  > if infection negative, [id number] mg/d of prednisolone for 30 days, followed by a rapid taper and withdrawal over the subsequent 2-4 weeks  > trend CBC, LFT's, PT/INR, MELD labs qd

## 2024-01-21 NOTE — PROGRESS NOTE ADULT - PROBLEM SELECTOR PLAN 2
- low Ca, K, phosphorus on admission - replete  - replete Mg+ aggressively  - thiamine 100mg IV, folic acid 1mg qd  - monitor and replete lytes as needed

## 2024-01-21 NOTE — PROGRESS NOTE ADULT - SUBJECTIVE AND OBJECTIVE BOX
Hepatology Progress Note    Interval Events:   Feeling well with no ongoing f/c/n/v/abd pain     Allergies:  No Known Drug Allergies  Tomatoes (Unknown)      Hospital Medications:  acetaminophen     Tablet .. 650 milliGRAM(s) Oral every 6 hours PRN  aluminum hydroxide/magnesium hydroxide/simethicone Suspension 30 milliLiter(s) Oral every 4 hours PRN  folic acid 1 milliGRAM(s) Oral daily  LORazepam   Injectable 2 milliGRAM(s) IV Push every 1 hour PRN  melatonin 3 milliGRAM(s) Oral at bedtime PRN  prednisoLONE    3 mG/mL Solution (ORAPRED) 30 milliGRAM(s) Oral daily      ROS: 14 point ROS negative unless otherwise state in subjective    PHYSICAL EXAM:   Vital Signs:  Vital Signs Last 24 Hrs  T(C): 37.1 (21 Jan 2024 04:26), Max: 37.2 (20 Jan 2024 11:38)  T(F): 98.7 (21 Jan 2024 04:26), Max: 98.9 (20 Jan 2024 11:38)  HR: 104 (21 Jan 2024 04:26) (104 - 128)  BP: 139/74 (21 Jan 2024 04:26) (120/68 - 139/74)  BP(mean): --  RR: 18 (21 Jan 2024 04:26) (18 - 18)  SpO2: 95% (21 Jan 2024 04:26) (91% - 96%)    Parameters below as of 21 Jan 2024 04:26  Patient On (Oxygen Delivery Method): room air      Daily     Daily     GENERAL:  No acute distress  HEENT:  +scleral icterus  CHEST: no resp distress  HEART:  RRR  ABDOMEN:  Soft, non-tender, non-distended, normoactive bowel sounds.   EXTREMITIES:  No pedal edema  NEURO:  Alert and oriented x 3, no asterixis, no tremor    LABS:                        10.5   7.33  )-----------( 56       ( 21 Jan 2024 05:56 )             31.4     Mean Cell Volume: 100.6 fl (01-21-24 @ 05:56)    01-21    135  |  102  |  11  ----------------------------<  98  3.5   |  21<L>  |  0.63    Ca    8.4      21 Jan 2024 05:55  Phos  3.1     01-21  Mg     1.6     01-21    TPro  8.0  /  Alb  3.3  /  TBili  2.6<H>  /  DBili  x   /  AST  103<H>  /  ALT  25  /  AlkPhos  109  01-21    LIVER FUNCTIONS - ( 21 Jan 2024 05:55 )  Alb: 3.3 g/dL / Pro: 8.0 g/dL / ALK PHOS: 109 U/L / ALT: 25 U/L / AST: 103 U/L / GGT: x           PT/INR - ( 21 Jan 2024 05:57 )   PT: 23.3 sec;   INR: 2.27 ratio         PTT - ( 19 Jan 2024 12:07 )  PTT:44.4 sec  Urinalysis Basic - ( 21 Jan 2024 05:55 )    Color: x / Appearance: x / SG: x / pH: x  Gluc: 98 mg/dL / Ketone: x  / Bili: x / Urobili: x   Blood: x / Protein: x / Nitrite: x   Leuk Esterase: x / RBC: x / WBC x   Sq Epi: x / Non Sq Epi: x / Bacteria: x      Imaging: julianna SIMMS

## 2024-01-21 NOTE — PROGRESS NOTE ADULT - SUBJECTIVE AND OBJECTIVE BOX
INTERVAL HPI/OVERNIGHT EVENTS:  Pt seen and examined at bedside. No acute overnight events or complaints.    VITAL SIGNS:  T(F): 98.7 (01-21-24 @ 04:26)  HR: 104 (01-21-24 @ 04:26)  BP: 139/74 (01-21-24 @ 04:26)  RR: 18 (01-21-24 @ 04:26)  SpO2: 95% (01-21-24 @ 04:26)  Wt(kg): --    PHYSICAL EXAM:    GENERAL: NAD  HEAD:  Atraumatic, Normocephalic  EYES: EOMI, conjunctiva and sclera clear.  CHEST/LUNG: Clear to auscultation bilaterally; No rales, rhonchi, wheezing, or rubs  HEART: Regular rate and rhythm; No murmurs, rubs, or gallops.  ABDOMEN: Soft, Nontender, Distended; Hepatomegaly.  SKIN: No rashes or lesions  NERVOUS SYSTEM:  Alert & Oriented X3, no focal deficits      MEDICATIONS  (STANDING):  folic acid 1 milliGRAM(s) Oral daily    MEDICATIONS  (PRN):  acetaminophen     Tablet .. 650 milliGRAM(s) Oral every 6 hours PRN Temp greater or equal to 38C (100.4F), Mild Pain (1 - 3)  aluminum hydroxide/magnesium hydroxide/simethicone Suspension 30 milliLiter(s) Oral every 4 hours PRN Dyspepsia  LORazepam   Injectable 2 milliGRAM(s) IV Push every 1 hour PRN Symptom-triggered: each CIWA -Ar score 8 or GREATER  melatonin 3 milliGRAM(s) Oral at bedtime PRN Insomnia      Allergies    No Known Drug Allergies  Tomatoes (Unknown)    Intolerances        LABS:                        10.5   7.33  )-----------( 56       ( 21 Jan 2024 05:56 )             31.4     01-21    135  |  102  |  11  ----------------------------<  98  3.5   |  21<L>  |  0.63    Ca    8.4      21 Jan 2024 05:55  Phos  3.1     01-21  Mg     1.6     01-21    TPro  8.0  /  Alb  3.3  /  TBili  2.6<H>  /  DBili  x   /  AST  103<H>  /  ALT  25  /  AlkPhos  109  01-21    PT/INR - ( 21 Jan 2024 05:57 )   PT: 23.3 sec;   INR: 2.27 ratio         PTT - ( 19 Jan 2024 12:07 )  PTT:44.4 sec  Urinalysis Basic - ( 21 Jan 2024 05:55 )    Color: x / Appearance: x / SG: x / pH: x  Gluc: 98 mg/dL / Ketone: x  / Bili: x / Urobili: x   Blood: x / Protein: x / Nitrite: x   Leuk Esterase: x / RBC: x / WBC x   Sq Epi: x / Non Sq Epi: x / Bacteria: x        RADIOLOGY & ADDITIONAL TESTS:  Reviewed      ******************  Authored By: Kristy Chacon MD PGY2  Internal Medicine  Pager: [phone] [facility]// [id number] LETTY  MS Teams Preferred  ******************

## 2024-01-21 NOTE — PROGRESS NOTE ADULT - ASSESSMENT
Reji Ramos is a 53 year old male wit PMHx notable for excessive alcohol use presenting with two days of tremors found to have elevated LFTs c/f acute alc hep in the setting of possible ETOH mediated cirrhosis     #Acute Alc Hep   Hx notable for heavy ETOH use with labs suggestive of acute alc hep. MDF>32 although labs overall improving with supportive care. Please obtain broad infectious workup in case steroids are need although suspect that labs will normalize in the next 2-3 days   -Please obtain Bcx,GI PCR, CXR, UA, and U/S with Doppler and dx tap if ascisted present    -Thiamine/Folate/MVI  -Consider MAT fotr undelriyng ETOH use on dischate     #Likely new ETOH mediated cirrohsis   Hx notable for hevay ETOH use with labs suggestive of posisble cirrohisis. Please obtain U/S with doppler and workup as noted below   -Volume: improving with low Na diet. Lower ext Doppler negative.   -Infection: see above   -Bleeding: no signs of any bleeding. Non-urgent EGD vs. NSBB   -HE: A/Ox3.  -Malnutiriton: please give IV Vitamin K 10 mg x 3 days     Recommendations:  -Please obtain RUQUS with doppler   -Please obtain Bcx, GI PCR, CXR, UA, and dx tap if ascites present   -Please continue with IV Vitamin K 10 mg x 3 days   -Follow up alpha-1 anti-trypsin (phenotype), ceruloplasm, AFP  -Follow up CRISTIANO, anti-mitochondrial antibody, anti-smooth muscle antibody, anti-liver kidney antibody, immunoglobulins (IgG, IgM, IgA quantitative) for autoimmune etiologies   -Follow up PETH   -Thiamine/Folate/MVI  -If labs continue to improve over the next 24-48 hours can likely be d.alfa home with close outpt f/u     All recommendations are tentative until note is attested by attending.

## 2024-01-22 ENCOUNTER — TRANSCRIPTION ENCOUNTER (OUTPATIENT)
Age: 54
End: 2024-01-22

## 2024-01-22 DIAGNOSIS — K70.10 ALCOHOLIC HEPATITIS WITHOUT ASCITES: ICD-10-CM

## 2024-01-22 DIAGNOSIS — K70.30 ALCOHOLIC CIRRHOSIS OF LIVER WITHOUT ASCITES: ICD-10-CM

## 2024-01-22 LAB
A1AT SERPL-MCNC: 200 MG/DL — SIGNIFICANT CHANGE UP (ref 90–200)
AFP-TM SERPL-MCNC: 7.8 NG/ML — SIGNIFICANT CHANGE UP
ALBUMIN SERPL ELPH-MCNC: 3.3 G/DL — SIGNIFICANT CHANGE UP (ref 3.3–5)
ALP SERPL-CCNC: 102 U/L — SIGNIFICANT CHANGE UP (ref 40–120)
ALT FLD-CCNC: 29 U/L — SIGNIFICANT CHANGE UP (ref 10–45)
ANION GAP SERPL CALC-SCNC: 13 MMOL/L — SIGNIFICANT CHANGE UP (ref 5–17)
APPEARANCE UR: CLEAR — SIGNIFICANT CHANGE UP
AST SERPL-CCNC: 104 U/L — HIGH (ref 10–40)
BACTERIA # UR AUTO: NEGATIVE /HPF — SIGNIFICANT CHANGE UP
BASOPHILS # BLD AUTO: 0.04 K/UL — SIGNIFICANT CHANGE UP (ref 0–0.2)
BASOPHILS NFR BLD AUTO: 0.4 % — SIGNIFICANT CHANGE UP (ref 0–2)
BILIRUB SERPL-MCNC: 2.6 MG/DL — HIGH (ref 0.2–1.2)
BILIRUB UR-MCNC: NEGATIVE — SIGNIFICANT CHANGE UP
BUN SERPL-MCNC: 13 MG/DL — SIGNIFICANT CHANGE UP (ref 7–23)
CALCIUM SERPL-MCNC: 8.8 MG/DL — SIGNIFICANT CHANGE UP (ref 8.4–10.5)
CAST: 0 /LPF — SIGNIFICANT CHANGE UP (ref 0–4)
CERULOPLASMIN SERPL-MCNC: 26 MG/DL — SIGNIFICANT CHANGE UP (ref 15–30)
CHLORIDE SERPL-SCNC: 101 MMOL/L — SIGNIFICANT CHANGE UP (ref 96–108)
CO2 SERPL-SCNC: 19 MMOL/L — LOW (ref 22–31)
COLOR SPEC: SIGNIFICANT CHANGE UP
CREAT SERPL-MCNC: 0.61 MG/DL — SIGNIFICANT CHANGE UP (ref 0.5–1.3)
DIFF PNL FLD: NEGATIVE — SIGNIFICANT CHANGE UP
EGFR: 115 ML/MIN/1.73M2 — SIGNIFICANT CHANGE UP
EOSINOPHIL # BLD AUTO: 0.02 K/UL — SIGNIFICANT CHANGE UP (ref 0–0.5)
EOSINOPHIL NFR BLD AUTO: 0.2 % — SIGNIFICANT CHANGE UP (ref 0–6)
GLUCOSE SERPL-MCNC: 117 MG/DL — HIGH (ref 70–99)
GLUCOSE UR QL: NEGATIVE MG/DL — SIGNIFICANT CHANGE UP
HCT VFR BLD CALC: 32.1 % — LOW (ref 39–50)
HGB BLD-MCNC: 10.7 G/DL — LOW (ref 13–17)
IGA FLD-MCNC: 1131 MG/DL — HIGH (ref 84–499)
IGG FLD-MCNC: 2681 MG/DL — HIGH (ref 610–1660)
IGM SERPL-MCNC: 210 MG/DL — SIGNIFICANT CHANGE UP (ref 35–242)
IMM GRANULOCYTES NFR BLD AUTO: 0.6 % — SIGNIFICANT CHANGE UP (ref 0–0.9)
INR BLD: 1.96 RATIO — HIGH (ref 0.85–1.18)
KAPPA LC SER QL IFE: 10.16 MG/DL — HIGH (ref 0.33–1.94)
KAPPA/LAMBDA FREE LIGHT CHAIN RATIO, SERUM: 1.87 RATIO — HIGH (ref 0.26–1.65)
KETONES UR-MCNC: ABNORMAL MG/DL
LAMBDA LC SER QL IFE: 5.43 MG/DL — HIGH (ref 0.57–2.63)
LEUKOCYTE ESTERASE UR-ACNC: NEGATIVE — SIGNIFICANT CHANGE UP
LYMPHOCYTES # BLD AUTO: 1.57 K/UL — SIGNIFICANT CHANGE UP (ref 1–3.3)
LYMPHOCYTES # BLD AUTO: 16.6 % — SIGNIFICANT CHANGE UP (ref 13–44)
MAGNESIUM SERPL-MCNC: 1.6 MG/DL — SIGNIFICANT CHANGE UP (ref 1.6–2.6)
MCHC RBC-ENTMCNC: 33.3 GM/DL — SIGNIFICANT CHANGE UP (ref 32–36)
MCHC RBC-ENTMCNC: 33.8 PG — SIGNIFICANT CHANGE UP (ref 27–34)
MCV RBC AUTO: 101.3 FL — HIGH (ref 80–100)
MELD SCORE WITH DIALYSIS: 32 POINTS — SIGNIFICANT CHANGE UP
MELD SCORE WITHOUT DIALYSIS: 21 POINTS — SIGNIFICANT CHANGE UP
MONOCYTES # BLD AUTO: 0.97 K/UL — HIGH (ref 0–0.9)
MONOCYTES NFR BLD AUTO: 10.2 % — SIGNIFICANT CHANGE UP (ref 2–14)
NEUTROPHILS # BLD AUTO: 6.81 K/UL — SIGNIFICANT CHANGE UP (ref 1.8–7.4)
NEUTROPHILS NFR BLD AUTO: 72 % — SIGNIFICANT CHANGE UP (ref 43–77)
NITRITE UR-MCNC: NEGATIVE — SIGNIFICANT CHANGE UP
NRBC # BLD: 0 /100 WBCS — SIGNIFICANT CHANGE UP (ref 0–0)
PH UR: 6.5 — SIGNIFICANT CHANGE UP (ref 5–8)
PHOSPHATE SERPL-MCNC: 3.3 MG/DL — SIGNIFICANT CHANGE UP (ref 2.5–4.5)
PLATELET # BLD AUTO: 68 K/UL — LOW (ref 150–400)
POTASSIUM SERPL-MCNC: 3.6 MMOL/L — SIGNIFICANT CHANGE UP (ref 3.5–5.3)
POTASSIUM SERPL-SCNC: 3.6 MMOL/L — SIGNIFICANT CHANGE UP (ref 3.5–5.3)
PROT SERPL-MCNC: 8.1 G/DL — SIGNIFICANT CHANGE UP (ref 6–8.3)
PROT UR-MCNC: NEGATIVE MG/DL — SIGNIFICANT CHANGE UP
PROTHROM AB SERPL-ACNC: 20.2 SEC — HIGH (ref 9.5–13)
RBC # BLD: 3.17 M/UL — LOW (ref 4.2–5.8)
RBC # FLD: 15.2 % — HIGH (ref 10.3–14.5)
RBC CASTS # UR COMP ASSIST: 4 /HPF — SIGNIFICANT CHANGE UP (ref 0–4)
REVIEW: SIGNIFICANT CHANGE UP
SODIUM SERPL-SCNC: 133 MMOL/L — LOW (ref 135–145)
SP GR SPEC: 1.02 — SIGNIFICANT CHANGE UP (ref 1–1.03)
SQUAMOUS # UR AUTO: 0 /HPF — SIGNIFICANT CHANGE UP (ref 0–5)
UROBILINOGEN FLD QL: 1 MG/DL — SIGNIFICANT CHANGE UP (ref 0.2–1)
WBC # BLD: 9.47 K/UL — SIGNIFICANT CHANGE UP (ref 3.8–10.5)
WBC # FLD AUTO: 9.47 K/UL — SIGNIFICANT CHANGE UP (ref 3.8–10.5)
WBC UR QL: 0 /HPF — SIGNIFICANT CHANGE UP (ref 0–5)

## 2024-01-22 PROCEDURE — 76705 ECHO EXAM OF ABDOMEN: CPT | Mod: 26

## 2024-01-22 PROCEDURE — 99232 SBSQ HOSP IP/OBS MODERATE 35: CPT | Mod: GC

## 2024-01-22 PROCEDURE — 99233 SBSQ HOSP IP/OBS HIGH 50: CPT

## 2024-01-22 RX ORDER — PHYTONADIONE (VIT K1) 5 MG
10 TABLET ORAL ONCE
Refills: 0 | Status: COMPLETED | OUTPATIENT
Start: 2024-01-22 | End: 2024-01-22

## 2024-01-22 RX ORDER — THIAMINE MONONITRATE (VIT B1) 100 MG
500 TABLET ORAL THREE TIMES A DAY
Refills: 0 | Status: DISCONTINUED | OUTPATIENT
Start: 2024-01-22 | End: 2024-01-23

## 2024-01-22 RX ADMIN — Medication 1 TABLET(S): at 11:58

## 2024-01-22 RX ADMIN — Medication 102 MILLIGRAM(S): at 08:14

## 2024-01-22 RX ADMIN — Medication 105 MILLIGRAM(S): at 13:51

## 2024-01-22 RX ADMIN — Medication 105 MILLIGRAM(S): at 21:35

## 2024-01-22 RX ADMIN — Medication 1 MILLIGRAM(S): at 11:59

## 2024-01-22 RX ADMIN — Medication 30 MILLIGRAM(S): at 05:39

## 2024-01-22 NOTE — PROGRESS NOTE ADULT - ASSESSMENT
52 yo with h/o alcohol use disorder p/w two days of tremors, anxiety, nausea and difficulty ambulating with last drink 2 weeks ago, found to have thrombocytopenia, INR 2.2, TBili 4.5, transaminitis, electrolyte abnormalities, ammonia 36, consistent with severe alcoholic hepatitis with probable new diagnosis of chronic cirrhosis.

## 2024-01-22 NOTE — DISCHARGE NOTE PROVIDER - NSDCCPTREATMENT_GEN_ALL_CORE_FT
PRINCIPAL PROCEDURE  Procedure: Right upper quadrant ultrasound  Findings and Treatment:   FINDINGS:  Liver: Enlarged left lobe of the liver. Right lobe measures 18.5 cm in   length. Echogenic and heterogeneous liver with mild nodular contours.  Bile ducts: Normal caliber. Common bile duct measures 3 mm.  Gallbladder: Wall thickened at 3 mm. No pericholecystic fluid. Mobile   stones with largest measuring up to 7 x 2 x 2 mm. No sonographic Nash   sign. No premedication given.  Pancreas: Visualized portions are within normal limits.  Right kidney: 11.3 cm. No hydronephrosis.  Ascites: None.  IVC: Visualized portions are within normal limits.  IMPRESSION:  Echogenic and heterogeneous liver with mild nodular contours, consistent   with cirrhosis.  No ascites.  Cholelithiasis. Borderline thickened gallbladder wall at 3 mm, likely   related to patient's hepatocellular dysfunction.

## 2024-01-22 NOTE — DISCHARGE NOTE PROVIDER - HOSPITAL COURSE
HPI:  52 yo M with h/o excessive alcohol use presenting with two days of tremors associated with nausea, emesis, sweating, and difficulty ambulating. Patient states that his last drink of alcohol was 2 weeks ago while in [place]. Currently endorsing cough, nausea, as well as bruising on his hands and legs. He states he has had alcohol withdrawal symptoms previously but does note that he has had issues with alcohol use in the past.    In the ED, patient was found to be vitally stable except tachycardia to 110's, tremulous and anxious with bruising on hands and altered mentation. Labs were notable for platelets 50, INR 2.2, TBili 4.5, AST//28, K 3.1, ammonia 36, negative lipase, iCa 0.99, alcohol level <10, RVP negative, CXR clear, and CT head and C-spine negative for acute pathology. CIWA scoring 6, 7. (19 Jan 2024 16:53)    Hospital Course:  Patient was admitted with concerns for alcohol withdrawal and placed on symptom-triggered CIWA protocol. He was tremulous and endorsed LE edema. Patient's admission bloodwork were concerning for alcoholic hepatitis as well as cirrhosis. Hepatology was consulted, and recommended obtaining infectious and autoimmune workup. He was started on prednisolone for his alcoholic hepatitis. His hepatitis panel came back negative. Right upper quadrant ultrasound showed cirrhosis. He is medically optimized and stable for discharge.    1/22: follow up autoimmune, BCx, started Vitamin K. Stopped steroids, RUQUS showing cirrhosis    Important Medication Changes and Reason:    Active or Pending Issues Requiring Follow-up:    Advanced Directives:   [ ] Full code  [ ] DNR  [ ] Hospice    Discharge Diagnoses:  Alcoholic hepatitis  Cirrhosis         HPI:  54 yo M with h/o excessive alcohol use presenting with two days of tremors associated with nausea, emesis, sweating, and difficulty ambulating. Patient states that his last drink of alcohol was 2 weeks ago while in [place]. Currently endorsing cough, nausea, as well as bruising on his hands and legs. He states he has had alcohol withdrawal symptoms previously but does note that he has had issues with alcohol use in the past.    In the ED, patient was found to be vitally stable except tachycardia to 110's, tremulous and anxious with bruising on hands and altered mentation. Labs were notable for platelets 50, INR 2.2, TBili 4.5, AST//28, K 3.1, ammonia 36, negative lipase, iCa 0.99, alcohol level <10, RVP negative, CXR clear, and CT head and C-spine negative for acute pathology. CIWA scoring 6, 7. (19 Jan 2024 16:53)    Hospital Course:  Patient was admitted with concerns for alcohol withdrawal and placed on symptom-triggered CIWA protocol. He was tremulous and endorsed LE edema. Patient's admission bloodwork were concerning for alcoholic hepatitis as well as cirrhosis. Hepatology was consulted, and recommended obtaining infectious and autoimmune workup. He was received two doses of prednisolone for his acute alcoholic hepatitis. His hepatitis panel came back negative. Right upper quadrant ultrasound showed cirrhosis. Autoimmune workup was also initiated. He is medically optimized and stable for discharge.    Important Medication Changes and Reason:  None    Active or Pending Issues Requiring Follow-up:  Cirrhosis - follow up with hepatology outpatient    Advanced Directives:   [ ] Full code  [ ] DNR  [ ] Hospice    Discharge Diagnoses:  Alcoholic hepatitis  Cirrhosis         HPI:  54 yo M with h/o excessive alcohol use presenting with two days of tremors associated with nausea, emesis, sweating, and difficulty ambulating. Patient states that his last drink of alcohol was 2 weeks ago while in [place]. Currently endorsing cough, nausea, as well as bruising on his hands and legs. He states he has had alcohol withdrawal symptoms previously but does note that he has had issues with alcohol use in the past.    In the ED, patient was found to be vitally stable except tachycardia to 110's, tremulous and anxious with bruising on hands and altered mentation. Labs were notable for platelets 50, INR 2.2, TBili 4.5, AST//28, K 3.1, ammonia 36, negative lipase, iCa 0.99, alcohol level <10, RVP negative, CXR clear, and CT head and C-spine negative for acute pathology. CIWA scoring 6, 7. (19 Jan 2024 16:53)    Hospital Course:  Patient was admitted with concerns for alcohol withdrawal and placed on symptom-triggered CIWA protocol. He was tremulous and endorsed LE edema. Patient's admission bloodwork were concerning for alcoholic hepatitis as well as cirrhosis. Hepatology was consulted, and recommended obtaining infectious and autoimmune workup. He was received two doses of prednisolone for his acute alcoholic hepatitis. His hepatitis panel came back negative. Right upper quadrant ultrasound showed cirrhosis. Autoimmune workup was also initiated. He is medically optimized and stable for discharge.    Important Medication Changes and Reason:  None    Active or Pending Issues Requiring Follow-up:  Cirrhosis - follow up with hepatology outpatient    Advanced Directives:   [x] Full code  [ ] DNR  [ ] Hospice    Discharge Diagnoses:  Alcoholic hepatitis  Cirrhosis  Alcohol Use disorder

## 2024-01-22 NOTE — PROGRESS NOTE ADULT - NUTRITIONAL ASSESSMENT
-Please obtain Bcx, GI PCR, CXR, UA, and dx tap if ascites present   -Please give IV Vitamin K 10 mg x 3 days   -Please send HAV, HBVsAb, HBVsAg, HBVcAb, HCV Ab, HCV RNA for viral etiologies  -Please send alpha-1 anti-trypsin (phenotype), ceruloplasm, AFP  -Please send CRISTIANO, anti-mitochondrial antibody, anti-smooth muscle antibody, anti-liver kidney antibody, immunoglobulins (IgG, IgM, IgA quantitative) for autoimmune etiologies   -Please obtain PETH

## 2024-01-22 NOTE — DISCHARGE NOTE PROVIDER - ATTENDING DISCHARGE PHYSICAL EXAMINATION:
GENERAL: No acute distress, well-developed  CHEST/LUNG: CTAB; No wheezes, rales, or rhonchi  HEART: Regular rate and rhythm; No murmurs, rubs, or gallops  ABDOMEN: Soft, non-tender, non-distended; normal bowel sounds. Hepatomegaly.  EXTREMITIES:  2+ peripheral pulses b/l, No clubbing, cyanosis, or edema  NEUROLOGY: A&O x 3, no focal deficits  SKIN: No rashes or lesions

## 2024-01-22 NOTE — PROGRESS NOTE ADULT - ASSESSMENT
Reji Ramos is a 53 year old male wit PMHx notable for excessive alcohol use presenting with two days of tremors found to have elevated LFTs c/f acute alc hep in the setting of possible ETOH mediated cirrhosis     #Acute Alc Hep   Hx notable for heavy ETOH use with labs suggestive of acute alc hep. MDF>32 although labs overall improving with supportive care. Please obtain broad infectious workup in case steroids are need although suspect that labs will normalize in the next 2-3 days. Please STOP steroids pending results of the above  -Please obtain Bcx,GI PCR, CXR, UA, and U/S with Doppler and dx tap if ascisted present    -Thiamine/Folate/MVI  -Consider MAT fotr undelriyng ETOH use on dischate     #Likely new ETOH mediated cirrohsis   Hx notable for hevay ETOH use with labs suggestive of posisble cirrohisis. Please obtain U/S with doppler and workup as noted below   -Volume: improving with low Na diet. Lower ext Doppler negative.   -Infection: see above   -Bleeding: no signs of any bleeding. Non-urgent EGD vs. NSBB   -HE: A/Ox3.  -Malnutiriton: please give IV Vitamin K 10 mg x 3 days     Recommendations:  -Please STOP steroids   -Please obtain RUQUS with doppler   -Please obtain Bcx, GI PCR, CXR, UA, and dx tap if ascites present   -Please continue with IV Vitamin K 10 mg x 3 days   -Follow up alpha-1 anti-trypsin (phenotype), ceruloplasm, AFP  -Follow up CRISTIANO, anti-mitochondrial antibody, anti-smooth muscle antibody, anti-liver kidney antibody, immunoglobulins (IgG, IgM, IgA quantitative) for autoimmune etiologies   -Follow up PETH   -Thiamine/Folate/MVI  -If labs continue to improve over the next 24-48 hours can likely be d/alfa home with close outpt f/u     All recommendations are tentative until note is attested by attending.

## 2024-01-22 NOTE — DISCHARGE NOTE PROVIDER - NSDCFUADDAPPT_GEN_ALL_CORE_FT
APPTS ARE READY TO BE MADE: [ ] YES    Best Family or Patient Contact (if needed):    Additional Information about above appointments (if needed):    1:   2:   3:     Other comments or requests:    APPTS ARE READY TO BE MADE: [x ] YES    Best Family or Patient Contact (if needed):    Additional Information about above appointments (if needed):    1:   2:   3:     Other comments or requests:    APPTS ARE READY TO BE MADE: [x ] YES    Best Family or Patient Contact (if needed):    Additional Information about above appointments (if needed):    1:   2:   3:     Other comments or requests:   Prior appt with Dr. Urrutia on 2/09.

## 2024-01-22 NOTE — PROGRESS NOTE ADULT - SUBJECTIVE AND OBJECTIVE BOX
Patient is a 53y old  Male who presents with a chief complaint of tremors (21 Jan 2024 10:30)      SUBJECTIVE / OVERNIGHT EVENTS:  No acute events overnight. Patient with periods of tremulousness, CIWA 4, now 1's. Patient at this time denies fevers, chills, CP, SOB, nausea, vomiting, diarrhea, constipation, dysuria. Patient seen and examined at bedside.    MEDICATIONS  (STANDING):  folic acid 1 milliGRAM(s) Oral daily  prednisoLONE    3 mG/mL Solution (ORAPRED) 30 milliGRAM(s) Oral daily    MEDICATIONS  (PRN):  acetaminophen     Tablet .. 650 milliGRAM(s) Oral every 6 hours PRN Temp greater or equal to 38C (100.4F), Mild Pain (1 - 3)  aluminum hydroxide/magnesium hydroxide/simethicone Suspension 30 milliLiter(s) Oral every 4 hours PRN Dyspepsia  LORazepam   Injectable 2 milliGRAM(s) IV Push every 1 hour PRN Symptom-triggered: each CIWA -Ar score 8 or GREATER  melatonin 3 milliGRAM(s) Oral at bedtime PRN Insomnia      CAPILLARY BLOOD GLUCOSE        I&O's Summary    21 Jan 2024 07:01  -  22 Jan 2024 07:00  --------------------------------------------------------  IN: 1620 mL / OUT: 0 mL / NET: 1620 mL        PHYSICAL EXAM:  Vital Signs Last 24 Hrs  T(C): 36.8 (22 Jan 2024 04:52), Max: 37.1 (21 Jan 2024 11:38)  T(F): 98.2 (22 Jan 2024 04:52), Max: 98.8 (21 Jan 2024 11:38)  HR: 94 (22 Jan 2024 04:52) (93 - 98)  BP: 124/78 (22 Jan 2024 04:52) (117/66 - 138/72)  BP(mean): --  RR: 18 (22 Jan 2024 04:52) (18 - 18)  SpO2: 95% (22 Jan 2024 04:52) (94% - 96%)    Parameters below as of 22 Jan 2024 04:52  Patient On (Oxygen Delivery Method): room air        GENERAL: No acute distress, well-developed  HEAD:  Atraumatic, Normocephalic  EYES: EOMI, PERRLA, conjunctiva and sclera clear  NECK: Supple, no lymphadenopathy, no JVD  CHEST/LUNG: CTAB; No wheezes, rales, or rhonchi  HEART: Regular rate and rhythm; No murmurs, rubs, or gallops  ABDOMEN: Soft, non-tender, non-distended; normal bowel sounds. Hepatomegaly.  EXTREMITIES:  2+ peripheral pulses b/l, No clubbing, cyanosis, or edema  NEUROLOGY: A&O x 3, no focal deficits  SKIN: No rashes or lesions    LABS:                        10.5   7.33  )-----------( 56       ( 21 Jan 2024 05:56 )             31.4     01-21    135  |  102  |  11  ----------------------------<  98  3.5   |  21<L>  |  0.63    Ca    8.4      21 Jan 2024 05:55  Phos  3.1     01-21  Mg     1.6     01-21    TPro  8.0  /  Alb  3.3  /  TBili  2.6<H>  /  DBili  x   /  AST  103<H>  /  ALT  25  /  AlkPhos  109  01-21    PT/INR - ( 21 Jan 2024 05:57 )   PT: 23.3 sec;   INR: 2.27 ratio               Urinalysis Basic - ( 21 Jan 2024 05:55 )    Color: x / Appearance: x / SG: x / pH: x  Gluc: 98 mg/dL / Ketone: x  / Bili: x / Urobili: x   Blood: x / Protein: x / Nitrite: x   Leuk Esterase: x / RBC: x / WBC x   Sq Epi: x / Non Sq Epi: x / Bacteria: x          RADIOLOGY & ADDITIONAL TESTS:  Results Reviewed:   Imaging Personally Reviewed:  Electrocardiogram Personally Reviewed:    COORDINATION OF CARE:  Care Discussed with Consultants/Other Providers [Y/N]:  Prior or Outpatient Records Reviewed [Y/N]:

## 2024-01-22 NOTE — DISCHARGE NOTE PROVIDER - CARE PROVIDER_API CALL
Joe Medrano  Internal Medicine  [address] [place] [id number]  Phone: [phone]  Fax: [phone]  Established Patient  Follow Up Time: 2 weeks   Joe Medrano  Internal Medicine  [address] [place] [id number]  Phone: [phone]  Fax: [phone]  Established Patient  Follow Up Time: 2 weeks    Naty Urrutia  Transplant Hepatology  [address] [place] [id number]  Phone: [phone]  Fax: [phone]  Established Patient  Follow Up Time: 1 week    Yobani Wong  Transplant Hepatology  [address] [place] [id number]  Phone: [phone]  Fax: [phone]  Follow Up Time: 2 weeks

## 2024-01-22 NOTE — PROGRESS NOTE ADULT - PROBLEM SELECTOR PLAN 2
On admission, platelets 50, INR 2.2, TBili 4.5, Cr 0.64, AST//28 - c/f severe alcoholic hepatitis possibly w/chronic cirrhosis  - MELD 24 on admission, Maddrey's DF 59.7 - severe alcoholic hepatitis and would benefit from glucocorticoids if broad infecious workup is negative  - on admission, exam with hepatomegaly, asterixis, altered sleep/wake cycle, mild confusion (AOx3), but no jaundice  - ammonia 36, less concerning for HE  - overall labs continuing to improve  > obtain RUQUS - dx tap if ascites present   > hepatology consult - recs appreciated  > hepatitis panel negative  > if no infection, 30-40 mg/d of prednisolone for 30 days, followed by a rapid taper over subsequent 2-4 weeks  > trend CBC, LFT's, PT/INR, MELD labs qd  > f/u Bcx (1/22), GI CXR, UA  > starting IV Vitamin K 10 mg x 3 days   > f/u CRISTIANO, AMA, ASMA, anti-LKM, Ig's, alpha-1 anti-trypsin (phenotype), ceruloplasmin, AFP

## 2024-01-22 NOTE — DISCHARGE NOTE PROVIDER - NSFOLLOWUPCLINICS_GEN_ALL_ED_FT
[facility]  Gastroenterology  [address] [place] [id number]  Phone: [phone]  Fax:   Follow Up Time: 2 weeks

## 2024-01-22 NOTE — DISCHARGE NOTE PROVIDER - NSDCCPCAREPLAN_GEN_ALL_CORE_FT
PRINCIPAL DISCHARGE DIAGNOSIS  Diagnosis: Alcohol dependence with withdrawal  Assessment and Plan of Treatment: Refrain completely from alcohol use. Alcohol withdrawal is a medical emergency and can cause seizures. The risk for addiction and complications, including cirrhosis (liver damage) and varices (which can cause bleeding), can be dangerous, irreversible, and deadly.      SECONDARY DISCHARGE DIAGNOSES  Diagnosis: Cirrhosis with alcoholism  Assessment and Plan of Treatment: You have been diagnosed with cirrhosis of the liver. This is a long-term (chronic) problem. It occurs when liver tissue is destroyed and replaced by scar tissue. Cirrhosis is likely to occur if you have a history of long-term alcohol abuse. Cirrhosis can't be cured. But it can be treated.  Call your healthcare provider right away if you have any of the following:  •	Fever of 100.4?F (38.0?C) or higher  •	Extreme tiredness (fatigue), weakness, or lack of appetite  •	Vomiting (with or without blood)  •	Yellowing of your skin or eyes (jaundice)  •	Itching  •	Swelling in your belly or legs  •	Black or tarry stools  •	Skin that bruises easily  •	Confusion or trouble thinking clearly

## 2024-01-22 NOTE — DISCHARGE NOTE PROVIDER - PROVIDER TOKENS
PROVIDER:[TOKEN:[[id number]],FOLLOWUP:[2 weeks],ESTABLISHEDPATIENT:[T]] PROVIDER:[TOKEN:[[id number]],FOLLOWUP:[2 weeks],ESTABLISHEDPATIENT:[T]],PROVIDER:[TOKEN:[[id number]],FOLLOWUP:[1 week],ESTABLISHEDPATIENT:[T]],PROVIDER:[TOKEN:[[id number]],FOLLOWUP:[2 weeks]]

## 2024-01-22 NOTE — SBIRT NOTE ADULT - NSSBIRTBRIEFINTDET_GEN_A_CORE
Patient reports last drinking 2 weeks ago. Patient reports drinking beer and vodka. Patient reports he used to be a . Patient provided T-SBIRT resource. Patient declines other resources at this time.

## 2024-01-22 NOTE — PROGRESS NOTE ADULT - SUBJECTIVE AND OBJECTIVE BOX
Hepatology Progress Note    Interval Events:   -Pt started on steroids by primary team   -Denies any ongoing fevers, chills, nausea, vomiting or abdominal pain     Allergies:  No Known Drug Allergies  Tomatoes (Unknown)      Hospital Medications:  acetaminophen     Tablet .. 650 milliGRAM(s) Oral every 6 hours PRN  aluminum hydroxide/magnesium hydroxide/simethicone Suspension 30 milliLiter(s) Oral every 4 hours PRN  folic acid 1 milliGRAM(s) Oral daily  LORazepam   Injectable 2 milliGRAM(s) IV Push every 1 hour PRN  melatonin 3 milliGRAM(s) Oral at bedtime PRN  multivitamin 1 Tablet(s) Oral daily  thiamine IVPB 500 milliGRAM(s) IV Intermittent three times a day      ROS: 14 point ROS negative unless otherwise state in subjective    PHYSICAL EXAM:   Vital Signs:  Vital Signs Last 24 Hrs  T(C): 36.8 (2024 04:52), Max: 37.1 (2024 11:38)  T(F): 98.2 (2024 04:52), Max: 98.8 (2024 11:38)  HR: 94 (2024 04:52) (93 - 98)  BP: 124/78 (2024 04:52) (117/66 - 138/72)  BP(mean): --  RR: 18 (2024 04:52) (18 - 18)  SpO2: 95% (2024 04:52) (94% - 96%)    Parameters below as of 2024 04:52  Patient On (Oxygen Delivery Method): room air      Daily     Daily Weight in k.8 (2024 07:38)    GENERAL:  No acute distress  HEENT:  NCAT, no scleral icterus  CHEST: no resp distress  HEART:  RRR  ABDOMEN:  Soft, non-tender, non-distended, normoactive bowel sounds  NEURO:  Alert and oriented x 3, no asterixis, no tremor    LABS:                        10.7   9.47  )-----------( 68       ( 2024 06:51 )             32.1     Mean Cell Volume: 101.3 fl (-24 @ 06:51)        133<L>  |  101  |  13  ----------------------------<  117<H>  3.6   |  19<L>  |  0.61    Ca    8.8      2024 06:46  Phos  3.3       Mg     1.6         TPro  8.1  /  Alb  3.3  /  TBili  2.6<H>  /  DBili  x   /  AST  104<H>  /  ALT  29  /  AlkPhos  102      LIVER FUNCTIONS - ( 2024 06:46 )  Alb: 3.3 g/dL / Pro: 8.1 g/dL / ALK PHOS: 102 U/L / ALT: 29 U/L / AST: 104 U/L / GGT: x           PT/INR - ( 2024 06:52 )   PT: 20.2 sec;   INR: 1.96 ratio           Urinalysis Basic - ( 2024 06:46 )    Color: x / Appearance: x / SG: x / pH: x  Gluc: 117 mg/dL / Ketone: x  / Bili: x / Urobili: x   Blood: x / Protein: x / Nitrite: x   Leuk Esterase: x / RBC: x / WBC x   Sq Epi: x / Non Sq Epi: x / Bacteria: x      Imaging: awaiting RUQUS

## 2024-01-22 NOTE — DISCHARGE NOTE PROVIDER - CARE PROVIDERS DIRECT ADDRESSES
,DirectAddress_Unknown ,DirectAddress_Unknown,[email or web address]

## 2024-01-22 NOTE — PROGRESS NOTE ADULT - PROBLEM SELECTOR PLAN 1
Presenting with tremor, anxiety, altered mentation, tachycardia c/w withdrawal although last drink however was 2 weeks ago; also with bruising on hands and legs c/w fall (endorsed difficulty ambulating) but with negative CT H and c-spine  - has had alcohol withdrawal in the past  > symptom-triggered CIWA; Ativan 2mg q1h PRN for symptom triggered CIWA >= 8  > Thiamine/Folate/MVI  > f/u PETH

## 2024-01-23 ENCOUNTER — TRANSCRIPTION ENCOUNTER (OUTPATIENT)
Age: 54
End: 2024-01-23

## 2024-01-23 VITALS
OXYGEN SATURATION: 95 % | DIASTOLIC BLOOD PRESSURE: 65 MMHG | RESPIRATION RATE: 16 BRPM | SYSTOLIC BLOOD PRESSURE: 127 MMHG | HEART RATE: 92 BPM | TEMPERATURE: 99 F

## 2024-01-23 DIAGNOSIS — K74.60 UNSPECIFIED CIRRHOSIS OF LIVER: ICD-10-CM

## 2024-01-23 LAB
ALBUMIN SERPL ELPH-MCNC: 3.1 G/DL — LOW (ref 3.3–5)
ALP SERPL-CCNC: 110 U/L — SIGNIFICANT CHANGE UP (ref 40–120)
ALT FLD-CCNC: 32 U/L — SIGNIFICANT CHANGE UP (ref 10–45)
ANION GAP SERPL CALC-SCNC: 10 MMOL/L — SIGNIFICANT CHANGE UP (ref 5–17)
AST SERPL-CCNC: 89 U/L — HIGH (ref 10–40)
BASOPHILS # BLD AUTO: 0.08 K/UL — SIGNIFICANT CHANGE UP (ref 0–0.2)
BASOPHILS NFR BLD AUTO: 0.9 % — SIGNIFICANT CHANGE UP (ref 0–2)
BILIRUB SERPL-MCNC: 1.9 MG/DL — HIGH (ref 0.2–1.2)
BUN SERPL-MCNC: 13 MG/DL — SIGNIFICANT CHANGE UP (ref 7–23)
CALCIUM SERPL-MCNC: 9 MG/DL — SIGNIFICANT CHANGE UP (ref 8.4–10.5)
CHLORIDE SERPL-SCNC: 104 MMOL/L — SIGNIFICANT CHANGE UP (ref 96–108)
CO2 SERPL-SCNC: 21 MMOL/L — LOW (ref 22–31)
CREAT SERPL-MCNC: 0.69 MG/DL — SIGNIFICANT CHANGE UP (ref 0.5–1.3)
EGFR: 111 ML/MIN/1.73M2 — SIGNIFICANT CHANGE UP
EOSINOPHIL # BLD AUTO: 0.08 K/UL — SIGNIFICANT CHANGE UP (ref 0–0.5)
EOSINOPHIL NFR BLD AUTO: 0.9 % — SIGNIFICANT CHANGE UP (ref 0–6)
GLUCOSE SERPL-MCNC: 108 MG/DL — HIGH (ref 70–99)
HCT VFR BLD CALC: 30.1 % — LOW (ref 39–50)
HGB BLD-MCNC: 10 G/DL — LOW (ref 13–17)
IMM GRANULOCYTES NFR BLD AUTO: 0.5 % — SIGNIFICANT CHANGE UP (ref 0–0.9)
INR BLD: 1.7 RATIO — HIGH (ref 0.85–1.18)
LKM AB SER-ACNC: <20.1 UNITS — SIGNIFICANT CHANGE UP (ref 0–20)
LYMPHOCYTES # BLD AUTO: 2.12 K/UL — SIGNIFICANT CHANGE UP (ref 1–3.3)
LYMPHOCYTES # BLD AUTO: 24.5 % — SIGNIFICANT CHANGE UP (ref 13–44)
MAGNESIUM SERPL-MCNC: 1.6 MG/DL — SIGNIFICANT CHANGE UP (ref 1.6–2.6)
MCHC RBC-ENTMCNC: 33.2 GM/DL — SIGNIFICANT CHANGE UP (ref 32–36)
MCHC RBC-ENTMCNC: 33.2 PG — SIGNIFICANT CHANGE UP (ref 27–34)
MCV RBC AUTO: 100 FL — SIGNIFICANT CHANGE UP (ref 80–100)
MONOCYTES # BLD AUTO: 1.11 K/UL — HIGH (ref 0–0.9)
MONOCYTES NFR BLD AUTO: 12.8 % — SIGNIFICANT CHANGE UP (ref 2–14)
NEUTROPHILS # BLD AUTO: 5.24 K/UL — SIGNIFICANT CHANGE UP (ref 1.8–7.4)
NEUTROPHILS NFR BLD AUTO: 60.4 % — SIGNIFICANT CHANGE UP (ref 43–77)
NRBC # BLD: 0 /100 WBCS — SIGNIFICANT CHANGE UP (ref 0–0)
PHOSPHATE SERPL-MCNC: 4.2 MG/DL — SIGNIFICANT CHANGE UP (ref 2.5–4.5)
PLATELET # BLD AUTO: 86 K/UL — LOW (ref 150–400)
POTASSIUM SERPL-MCNC: 3.5 MMOL/L — SIGNIFICANT CHANGE UP (ref 3.5–5.3)
POTASSIUM SERPL-SCNC: 3.5 MMOL/L — SIGNIFICANT CHANGE UP (ref 3.5–5.3)
PROT SERPL-MCNC: 7.6 G/DL — SIGNIFICANT CHANGE UP (ref 6–8.3)
PROTHROM AB SERPL-ACNC: 17.6 SEC — HIGH (ref 9.5–13)
RBC # BLD: 3.01 M/UL — LOW (ref 4.2–5.8)
RBC # FLD: 15.5 % — HIGH (ref 10.3–14.5)
SARS-COV-2 RNA SPEC QL NAA+PROBE: SIGNIFICANT CHANGE UP
SODIUM SERPL-SCNC: 135 MMOL/L — SIGNIFICANT CHANGE UP (ref 135–145)
WBC # BLD: 8.67 K/UL — SIGNIFICANT CHANGE UP (ref 3.8–10.5)
WBC # FLD AUTO: 8.67 K/UL — SIGNIFICANT CHANGE UP (ref 3.8–10.5)

## 2024-01-23 PROCEDURE — 87635 SARS-COV-2 COVID-19 AMP PRB: CPT

## 2024-01-23 PROCEDURE — 82435 ASSAY OF BLOOD CHLORIDE: CPT

## 2024-01-23 PROCEDURE — 82784 ASSAY IGA/IGD/IGG/IGM EACH: CPT

## 2024-01-23 PROCEDURE — 96374 THER/PROPH/DIAG INJ IV PUSH: CPT

## 2024-01-23 PROCEDURE — 85730 THROMBOPLASTIN TIME PARTIAL: CPT

## 2024-01-23 PROCEDURE — 71045 X-RAY EXAM CHEST 1 VIEW: CPT

## 2024-01-23 PROCEDURE — 82105 ALPHA-FETOPROTEIN SERUM: CPT

## 2024-01-23 PROCEDURE — 86038 ANTINUCLEAR ANTIBODIES: CPT

## 2024-01-23 PROCEDURE — 86850 RBC ANTIBODY SCREEN: CPT

## 2024-01-23 PROCEDURE — 86901 BLOOD TYPING SEROLOGIC RH(D): CPT

## 2024-01-23 PROCEDURE — 82962 GLUCOSE BLOOD TEST: CPT

## 2024-01-23 PROCEDURE — 85018 HEMOGLOBIN: CPT

## 2024-01-23 PROCEDURE — 87040 BLOOD CULTURE FOR BACTERIA: CPT

## 2024-01-23 PROCEDURE — 80307 DRUG TEST PRSMV CHEM ANLYZR: CPT

## 2024-01-23 PROCEDURE — 86381 MITOCHONDRIAL ANTIBODY EACH: CPT

## 2024-01-23 PROCEDURE — 86900 BLOOD TYPING SEROLOGIC ABO: CPT

## 2024-01-23 PROCEDURE — 83735 ASSAY OF MAGNESIUM: CPT

## 2024-01-23 PROCEDURE — 93005 ELECTROCARDIOGRAM TRACING: CPT

## 2024-01-23 PROCEDURE — 82803 BLOOD GASES ANY COMBINATION: CPT

## 2024-01-23 PROCEDURE — 80048 BASIC METABOLIC PNL TOTAL CA: CPT

## 2024-01-23 PROCEDURE — 86255 FLUORESCENT ANTIBODY SCREEN: CPT

## 2024-01-23 PROCEDURE — 82947 ASSAY GLUCOSE BLOOD QUANT: CPT

## 2024-01-23 PROCEDURE — 93970 EXTREMITY STUDY: CPT

## 2024-01-23 PROCEDURE — 72125 CT NECK SPINE W/O DYE: CPT | Mod: MA

## 2024-01-23 PROCEDURE — 86376 MICROSOMAL ANTIBODY EACH: CPT

## 2024-01-23 PROCEDURE — 85610 PROTHROMBIN TIME: CPT

## 2024-01-23 PROCEDURE — 83690 ASSAY OF LIPASE: CPT

## 2024-01-23 PROCEDURE — 36415 COLL VENOUS BLD VENIPUNCTURE: CPT

## 2024-01-23 PROCEDURE — 84100 ASSAY OF PHOSPHORUS: CPT

## 2024-01-23 PROCEDURE — 82140 ASSAY OF AMMONIA: CPT

## 2024-01-23 PROCEDURE — 76705 ECHO EXAM OF ABDOMEN: CPT

## 2024-01-23 PROCEDURE — G0480: CPT

## 2024-01-23 PROCEDURE — 80053 COMPREHEN METABOLIC PANEL: CPT

## 2024-01-23 PROCEDURE — 87637 SARSCOV2&INF A&B&RSV AMP PRB: CPT

## 2024-01-23 PROCEDURE — 81001 URINALYSIS AUTO W/SCOPE: CPT

## 2024-01-23 PROCEDURE — 97161 PT EVAL LOW COMPLEX 20 MIN: CPT

## 2024-01-23 PROCEDURE — 84484 ASSAY OF TROPONIN QUANT: CPT

## 2024-01-23 PROCEDURE — 82330 ASSAY OF CALCIUM: CPT

## 2024-01-23 PROCEDURE — 86704 HEP B CORE ANTIBODY TOTAL: CPT

## 2024-01-23 PROCEDURE — 85014 HEMATOCRIT: CPT

## 2024-01-23 PROCEDURE — 80074 ACUTE HEPATITIS PANEL: CPT

## 2024-01-23 PROCEDURE — 84295 ASSAY OF SERUM SODIUM: CPT

## 2024-01-23 PROCEDURE — 85025 COMPLETE CBC W/AUTO DIFF WBC: CPT

## 2024-01-23 PROCEDURE — 70450 CT HEAD/BRAIN W/O DYE: CPT | Mod: MA

## 2024-01-23 PROCEDURE — 99285 EMERGENCY DEPT VISIT HI MDM: CPT | Mod: 25

## 2024-01-23 PROCEDURE — 82103 ALPHA-1-ANTITRYPSIN TOTAL: CPT

## 2024-01-23 PROCEDURE — 99239 HOSP IP/OBS DSCHRG MGMT >30: CPT | Mod: GC

## 2024-01-23 PROCEDURE — 82390 ASSAY OF CERULOPLASMIN: CPT

## 2024-01-23 PROCEDURE — 84132 ASSAY OF SERUM POTASSIUM: CPT

## 2024-01-23 PROCEDURE — 83605 ASSAY OF LACTIC ACID: CPT

## 2024-01-23 RX ADMIN — Medication 105 MILLIGRAM(S): at 05:20

## 2024-01-23 RX ADMIN — Medication 105 MILLIGRAM(S): at 14:11

## 2024-01-23 RX ADMIN — Medication 1 MILLIGRAM(S): at 12:34

## 2024-01-23 RX ADMIN — Medication 1 TABLET(S): at 12:34

## 2024-01-23 NOTE — PROGRESS NOTE ADULT - PROBLEM SELECTOR PROBLEM 3
Need for prophylactic measure
Electrolyte imbalance
Need for prophylactic measure
Alcoholic hepatitis

## 2024-01-23 NOTE — PROGRESS NOTE ADULT - SUBJECTIVE AND OBJECTIVE BOX
Patient is a 53y old  Male who presents with a chief complaint of tremors (22 Jan 2024 16:48)      SUBJECTIVE / OVERNIGHT EVENTS:  No acute events overnight. CIWA 0-1 overnight. Patient at this time denies fevers, chills, CP, SOB, nausea, vomiting, diarrhea, constipation, dysuria. Patient seen and examined at bedside.    MEDICATIONS  (STANDING):  folic acid 1 milliGRAM(s) Oral daily  multivitamin 1 Tablet(s) Oral daily  thiamine IVPB 500 milliGRAM(s) IV Intermittent three times a day    MEDICATIONS  (PRN):  acetaminophen     Tablet .. 650 milliGRAM(s) Oral every 6 hours PRN Temp greater or equal to 38C (100.4F), Mild Pain (1 - 3)  aluminum hydroxide/magnesium hydroxide/simethicone Suspension 30 milliLiter(s) Oral every 4 hours PRN Dyspepsia  LORazepam   Injectable 2 milliGRAM(s) IV Push every 1 hour PRN Symptom-triggered: each CIWA -Ar score 8 or GREATER  melatonin 3 milliGRAM(s) Oral at bedtime PRN Insomnia      CAPILLARY BLOOD GLUCOSE        I&O's Summary    22 Jan 2024 07:01  -  23 Jan 2024 07:00  --------------------------------------------------------  IN: 480 mL / OUT: 0 mL / NET: 480 mL        PHYSICAL EXAM:  Vital Signs Last 24 Hrs  T(C): 36.6 (23 Jan 2024 04:15), Max: 36.8 (22 Jan 2024 21:44)  T(F): 97.9 (23 Jan 2024 04:15), Max: 98.3 (22 Jan 2024 21:44)  HR: 95 (23 Jan 2024 04:15) (84 - 95)  BP: 146/81 (23 Jan 2024 04:15) (125/70 - 146/81)  BP(mean): --  RR: 16 (23 Jan 2024 04:15) (16 - 18)  SpO2: 96% (23 Jan 2024 04:15) (96% - 97%)    Parameters below as of 23 Jan 2024 04:15  Patient On (Oxygen Delivery Method): room air        GENERAL: No acute distress, well-developed  HEAD:  Atraumatic, Normocephalic  EYES: EOMI, PERRLA, conjunctiva and sclera clear  NECK: Supple, no lymphadenopathy, no JVD  CHEST/LUNG: CTAB; No wheezes, rales, or rhonchi  HEART: Regular rate and rhythm; No murmurs, rubs, or gallops  ABDOMEN: Soft, non-tender, non-distended; normal bowel sounds. Hepatomegaly.  EXTREMITIES:  2+ peripheral pulses b/l, No clubbing, cyanosis, or edema  NEUROLOGY: A&O x 3, no focal deficits  SKIN: No rashes or lesions    LABS:                        10.0   8.67  )-----------( 86       ( 23 Jan 2024 06:01 )             30.1     01-23    135  |  104  |  13  ----------------------------<  108<H>  3.5   |  21<L>  |  0.69    Ca    9.0      23 Jan 2024 06:00  Phos  4.2     01-23  Mg     1.6     01-23    TPro  7.6  /  Alb  3.1<L>  /  TBili  1.9<H>  /  DBili  x   /  AST  89<H>  /  ALT  32  /  AlkPhos  110  01-23    PT/INR - ( 23 Jan 2024 06:02 )   PT: 17.6 sec;   INR: 1.70 ratio               Urinalysis Basic - ( 23 Jan 2024 06:00 )    Color: x / Appearance: x / SG: x / pH: x  Gluc: 108 mg/dL / Ketone: x  / Bili: x / Urobili: x   Blood: x / Protein: x / Nitrite: x   Leuk Esterase: x / RBC: x / WBC x   Sq Epi: x / Non Sq Epi: x / Bacteria: x          RADIOLOGY & ADDITIONAL TESTS:  Results Reviewed:   Imaging Personally Reviewed:  Electrocardiogram Personally Reviewed:    COORDINATION OF CARE:  Care Discussed with Consultants/Other Providers [Y/N]:  Prior or Outpatient Records Reviewed [Y/N]:

## 2024-01-23 NOTE — PROGRESS NOTE ADULT - PROVIDER SPECIALTY LIST ADULT
Hepatology
Internal Medicine

## 2024-01-23 NOTE — PROGRESS NOTE ADULT - PROBLEM SELECTOR PLAN 2
On admission, platelets 50, INR 2.2, TBili 4.5, Cr 0.64, AST//28 - c/f severe alcoholic hepatitis possibly w/chronic cirrhosis  - MELD 24 on admission, improving  - on admission, exam with hepatomegaly, asterixis, altered sleep/wake cycle, mild confusion (AOx3), but no jaundice  - ammonia 36, less concerning for HE  - overall labs continuing to improve  > RUQUS showing cirrhosis  > hepatology consult - recs appreciated  > hepatitis panel negative  > if no infection, 30-40 mg/d of prednisolone for 30 days, followed by a rapid taper over subsequent 2-4 weeks  > trend CBC, LFT's, PT/INR, MELD labs qd  > f/u Bcx (1/22), GI PCR, CXR, UA  > IV Vitamin K 10 mg x 3 days per hepatology  > f/u CRISTIANO, AMA, ASMA, anti-LKM, Ig's, alpha-1 anti-trypsin (phenotype), ceruloplasmin, AFP  -elevated IgA, IgM  -negative alpha-1 anti-trypsin (phenotype), ceruloplasmin, AFP negative On admission, platelets 50, INR 2.2, TBili 4.5, Cr 0.64, AST//28 - c/f severe alcoholic hepatitis possibly w/chronic cirrhosis  - MELD 24 on admission, improving  - on admission, exam with hepatomegaly, asterixis, altered sleep/wake cycle, mild confusion (AOx3), but no jaundice  - ammonia 36, less concerning for HE  - overall labs continuing to improve  > RUQUS showing cirrhosis, +Cholelithiasis likely complication of cirrhotic changes  > hepatology consult - recs appreciated  > hepatitis panel negative  > if no infection, 30-40 mg/d of prednisolone for 30 days, followed by a rapid taper over subsequent 2-4 weeks  > trend CBC, LFT's, PT/INR, MELD labs qd  > f/u Bcx (1/22), GI PCR, CXR, UA  > IV Vitamin K 10 mg x 3 days per hepatology  > f/u CRISTIANO, AMA, ASMA, anti-LKM, Ig's, alpha-1 anti-trypsin (phenotype), ceruloplasmin, AFP  -elevated IgA, IgM  -negative alpha-1 anti-trypsin (phenotype), ceruloplasmin, AFP negative

## 2024-01-23 NOTE — PROGRESS NOTE ADULT - ATTENDING COMMENTS
52 y/o M with AUd and alcohol associated liver disease admitted for elevated liver chemistries in the setting of recent heavy drinking. Clinically picture likely related to alc hep.    Liver chem and synthetic function is improving with abstinence.  Hold steroids  Supportive care.   Follow up rest of liver disease work up as documented in fellow's note.  CIWA, MVI, thiamine, Folate.  Needs relapse prevention program.
53M with no reported PMH, significant Etoh abuse p/w tremors - long standing drinking history, ~1 bottle of wine for the past 14 years, decided to quite 2 weeks ago after noticing weight loss. His evaluation is concerning for new onset cirrhosis. Pt himself has no known history of liver issues.     #evaluation for new-onset cirrhosis  #hx of alcohol use disorder    - obtain RUQ ultrasound, ordered and pending  - viral hepatitis profile negative  - starting prednisolone today  - continue symptom triggered CIWA. Pt has not been scoring on CIWA. Will discontinue if pt continues to be asymptomatic through the weekend  - hepatology consulted    Rest of plan as per resident note above .
53M with no reported PMH, significant Etoh abuse p/w tremors - long standing drinking history, ~1 bottle of wine for the past 14 years, decided to quite 2 weeks ago after noticing weight loss. His evaluation is concerning for new onset cirrhosis. Pt himself has no known history of liver issues.     #Cirrhosis  #Alcohol use disorder    - RUQ ultrasound shows cirrhosis, no ascites, +Cholelithiasis  - viral hepatitis profile negative  - discontinued prednisolone given infectious workup not complete, but bilirubin continues to improve   - hepatology consulted, recs appreciated, stable for dc today 1/23/24. 42 minutes spent coordinating dc
53M with no reported PMH, significant Etoh abuse p/w tremors - long standing drinking history, ~1 bottle of wine for the past 14 years, decided to quite 2 weeks ago after noticing weight loss. His evaluation is concerning for new onset cirrhosis. Pt himself has no known history of liver issues.     Pt overal feels improved since hospital presentation. He is AAOx3. Tremors are improved. He has no acute complaints    #evaluation for new-onset cirrhosis  #hx of alcohol use disorder    - obtain RUQ ultrasound  - hepatology consult  - f/u viral hepatitis profile  - continue symptom triggered CIWA. Pt has not been scoring on CIWA. Will discontinue if pt continues to be asymptomatic through the weekend  - obtain lower extremity dopplers (pt endorsed bilateral calf pain after flight from [place])    Rest of plan as per resident note above
53M with no reported PMH, significant Etoh abuse p/w tremors - long standing drinking history, ~1 bottle of wine for the past 14 years, decided to quite 2 weeks ago after noticing weight loss. His evaluation is concerning for new onset cirrhosis. Pt himself has no known history of liver issues.     #Cirrhosis  #Alcohol use disorder    - RUQ ultrasound shos cirrhosis, no ascites, +Cholelithiasis  - viral hepatitis profile negative  - discontinued prednisolone today given infectious workup not complete  - continue symptom triggered CIWA, will probably stop checking if stable   - hepatology consulted, f/u recs    discussed with Dr. Gee, representing [facility]

## 2024-01-23 NOTE — PROGRESS NOTE ADULT - PROBLEM SELECTOR PLAN 4
- low Ca, K, phosphorus on admission - replete  - replete Mg+ aggressively  - thiamine 100mg IV, folic acid 1mg qd  - monitor and replete lytes as needed
DVT: SCDs  Diet: regular  Dispo: pending clinical course

## 2024-01-23 NOTE — PROGRESS NOTE ADULT - SUBJECTIVE AND OBJECTIVE BOX
Gastroenterology/Hepatology Progress Note      Interval Events:         Allergies:  No Known Drug Allergies  Tomatoes (Unknown)      Hospital Medications:  acetaminophen     Tablet .. 650 milliGRAM(s) Oral every 6 hours PRN  aluminum hydroxide/magnesium hydroxide/simethicone Suspension 30 milliLiter(s) Oral every 4 hours PRN  folic acid 1 milliGRAM(s) Oral daily  LORazepam   Injectable 2 milliGRAM(s) IV Push every 1 hour PRN  melatonin 3 milliGRAM(s) Oral at bedtime PRN  multivitamin 1 Tablet(s) Oral daily  thiamine IVPB 500 milliGRAM(s) IV Intermittent three times a day      ROS: 14 point ROS negative unless otherwise state in subjective    PHYSICAL EXAM:   Vital Signs:  Vital Signs Last 24 Hrs  T(C): 36.7 (2024 12:11), Max: 36.8 (2024 21:44)  T(F): 98 (2024 12:11), Max: 98.3 (2024 21:44)  HR: 91 (2024 12:11) (84 - 95)  BP: 131/66 (2024 12:11) (125/65 - 146/81)  BP(mean): --  RR: 18 (2024 12:11) (16 - 18)  SpO2: 96% (2024 12:11) (95% - 97%)    Parameters below as of 2024 12:11  Patient On (Oxygen Delivery Method): room air      Daily     Daily Weight in k.9 (2024 10:00)    GENERAL:  No acute distress  HEENT:  NCAT, no scleral icterus  CHEST: no resp distress  HEART:  RRR  ABDOMEN:  Soft, non-tender, non-distended, normoactive bowel sounds, no masses  EXTREMITIES:  No cyanosis, clubbing, or edema  SKIN:  No rash/erythema/ecchymoses/petechiae/wounds/abscess/warm/dry  NEURO:  Alert and oriented x 3, no asterixis, no tremor    LABS:                        10.0   8.67  )-----------( 86       ( 2024 06:01 )             30.1     Mean Cell Volume: 100.0 fl (24 @ 06:01)        135  |  104  |  13  ----------------------------<  108<H>  3.5   |  21<L>  |  0.69    Ca    9.0      2024 06:00  Phos  4.2       Mg     1.6         TPro  7.6  /  Alb  3.1<L>  /  TBili  1.9<H>  /  DBili  x   /  AST  89<H>  /  ALT  32  /  AlkPhos  110      LIVER FUNCTIONS - ( 2024 06:00 )  Alb: 3.1 g/dL / Pro: 7.6 g/dL / ALK PHOS: 110 U/L / ALT: 32 U/L / AST: 89 U/L / GGT: x           PT/INR - ( 2024 06:02 )   PT: 17.6 sec;   INR: 1.70 ratio           Urinalysis Basic - ( 2024 06:00 )    Color: x / Appearance: x / SG: x / pH: x  Gluc: 108 mg/dL / Ketone: x  / Bili: x / Urobili: x   Blood: x / Protein: x / Nitrite: x   Leuk Esterase: x / RBC: x / WBC x   Sq Epi: x / Non Sq Epi: x / Bacteria: x            Imaging:           Gastroenterology/Hepatology Progress Note      Interval Events:     - Patient       Allergies:  No Known Drug Allergies  Tomatoes (Unknown)      Hospital Medications:  acetaminophen     Tablet .. 650 milliGRAM(s) Oral every 6 hours PRN  aluminum hydroxide/magnesium hydroxide/simethicone Suspension 30 milliLiter(s) Oral every 4 hours PRN  folic acid 1 milliGRAM(s) Oral daily  LORazepam   Injectable 2 milliGRAM(s) IV Push every 1 hour PRN  melatonin 3 milliGRAM(s) Oral at bedtime PRN  multivitamin 1 Tablet(s) Oral daily  thiamine IVPB 500 milliGRAM(s) IV Intermittent three times a day      ROS: 14 point ROS negative unless otherwise state in subjective    PHYSICAL EXAM:   Vital Signs:  Vital Signs Last 24 Hrs  T(C): 36.7 (2024 12:11), Max: 36.8 (2024 21:44)  T(F): 98 (2024 12:11), Max: 98.3 (2024 21:44)  HR: 91 (2024 12:11) (84 - 95)  BP: 131/66 (2024 12:11) (125/65 - 146/81)  BP(mean): --  RR: 18 (2024 12:11) (16 - 18)  SpO2: 96% (2024 12:11) (95% - 97%)    Parameters below as of 2024 12:11  Patient On (Oxygen Delivery Method): room air      Daily     Daily Weight in k.9 (2024 10:00)    GENERAL:  No acute distress  HEENT:  NCAT, no scleral icterus  CHEST: no resp distress  HEART:  RRR  ABDOMEN:  Soft, non-tender, non-distended, normoactive bowel sounds, no masses  EXTREMITIES:  No cyanosis, clubbing, or edema  SKIN:  No rash/erythema/ecchymoses/petechiae/wounds/abscess/warm/dry  NEURO:  Alert and oriented x 3, no asterixis, no tremor    LABS:                        10.0   8.67  )-----------( 86       ( 2024 06:01 )             30.1     Mean Cell Volume: 100.0 fl (24 @ 06:01)        135  |  104  |  13  ----------------------------<  108<H>  3.5   |  21<L>  |  0.69    Ca    9.0      2024 06:00  Phos  4.2       Mg     1.6         TPro  7.6  /  Alb  3.1<L>  /  TBili  1.9<H>  /  DBili  x   /  AST  89<H>  /  ALT  32  /  AlkPhos  110      LIVER FUNCTIONS - ( 2024 06:00 )  Alb: 3.1 g/dL / Pro: 7.6 g/dL / ALK PHOS: 110 U/L / ALT: 32 U/L / AST: 89 U/L / GGT: x           PT/INR - ( 2024 06:02 )   PT: 17.6 sec;   INR: 1.70 ratio           Urinalysis Basic - ( 2024 06:00 )    Color: x / Appearance: x / SG: x / pH: x  Gluc: 108 mg/dL / Ketone: x  / Bili: x / Urobili: x   Blood: x / Protein: x / Nitrite: x   Leuk Esterase: x / RBC: x / WBC x   Sq Epi: x / Non Sq Epi: x / Bacteria: x            Imaging:           Gastroenterology/Hepatology Progress Note      Interval Events:     - Patient feels well overall.   - Denied abd pain, nausea and vomiting.   - No fevers and chills.   - liver eznymes have been trending down.       Allergies:  No Known Drug Allergies  Tomatoes (Unknown)      Hospital Medications:  acetaminophen     Tablet .. 650 milliGRAM(s) Oral every 6 hours PRN  aluminum hydroxide/magnesium hydroxide/simethicone Suspension 30 milliLiter(s) Oral every 4 hours PRN  folic acid 1 milliGRAM(s) Oral daily  LORazepam   Injectable 2 milliGRAM(s) IV Push every 1 hour PRN  melatonin 3 milliGRAM(s) Oral at bedtime PRN  multivitamin 1 Tablet(s) Oral daily  thiamine IVPB 500 milliGRAM(s) IV Intermittent three times a day      ROS: 14 point ROS negative unless otherwise state in subjective    PHYSICAL EXAM:   Vital Signs:  Vital Signs Last 24 Hrs  T(C): 36.7 (2024 12:11), Max: 36.8 (2024 21:44)  T(F): 98 (2024 12:11), Max: 98.3 (2024 21:44)  HR: 91 (2024 12:11) (84 - 95)  BP: 131/66 (2024 12:11) (125/65 - 146/81)  BP(mean): --  RR: 18 (2024 12:11) (16 - 18)  SpO2: 96% (2024 12:11) (95% - 97%)    Parameters below as of 2024 12:11  Patient On (Oxygen Delivery Method): room air      Daily     Daily Weight in k.9 (2024 10:00)    GENERAL:  No acute distress  HEENT:  NCAT, no scleral icterus  CHEST: no resp distress  HEART:  RRR  ABDOMEN:  Soft, non-tender, non-distended, normoactive bowel sounds  NEURO:  Alert and oriented x 3, no asterixis, no tremor    LABS:                        10.0   8.67  )-----------( 86       ( 2024 06:01 )             30.1     Mean Cell Volume: 100.0 fl (24 @ 06:01)        135  |  104  |  13  ----------------------------<  108<H>  3.5   |  21<L>  |  0.69    Ca    9.0      2024 06:00  Phos  4.2       Mg     1.6         TPro  7.6  /  Alb  3.1<L>  /  TBili  1.9<H>  /  DBili  x   /  AST  89<H>  /  ALT  32  /  AlkPhos  110      LIVER FUNCTIONS - ( 2024 06:00 )  Alb: 3.1 g/dL / Pro: 7.6 g/dL / ALK PHOS: 110 U/L / ALT: 32 U/L / AST: 89 U/L / GGT: x           PT/INR - ( 2024 06:02 )   PT: 17.6 sec;   INR: 1.70 ratio           Urinalysis Basic - ( 2024 06:00 )    Color: x / Appearance: x / SG: x / pH: x  Gluc: 108 mg/dL / Ketone: x  / Bili: x / Urobili: x   Blood: x / Protein: x / Nitrite: x   Leuk Esterase: x / RBC: x / WBC x   Sq Epi: x / Non Sq Epi: x / Bacteria: x            Imaging:      Abd US    FINDINGS:  Liver: Enlarged left lobe of the liver. Right lobe measures 18.5 cm in   length. Echogenic and heterogeneous liver with mild nodular contours.  Bile ducts: Normal caliber. Common bile duct measures 3 mm.  Gallbladder: Wall thickened at 3 mm. No pericholecystic fluid. Mobile   stones with largest measuring up to 7 x 2 x 2 mm. No sonographic Nash   sign. No premedication given.  Pancreas: Visualized portions are within normal limits.  Right kidney: 11.3 cm. No hydronephrosis.  Ascites: None.  IVC: Visualized portions are within normal limits.    IMPRESSION:  Echogenic and heterogeneous liver with mild nodular contours, consistent   with cirrhosis.  No ascites.  Cholelithiasis. Borderline thickened gallbladder wall at 3 mm, likely   related to patient's hepatocellular dysfunction.

## 2024-01-23 NOTE — PROGRESS NOTE ADULT - PROBLEM SELECTOR PLAN 3
- Ni's DF 59.7 - severe alcoholic hepatitis  > hepatology consult - recs appreciated  > if no infection, 30-40 mg/d of prednisolone for 30 days, followed by a rapid taper over subsequent 2-4 weeks  > plan for cirrhosis and alcohol withdrawal as above

## 2024-01-23 NOTE — PROGRESS NOTE ADULT - ASSESSMENT
Reji Ramos is a 53 year old male wit PMHx notable for excessive alcohol use presenting with two days of tremors found to have elevated LFTs c/f acute alc hep in the setting of possible ETOH mediated cirrhosis     #Acute Alc Hep   Hx notable for heavy ETOH use with labs suggestive of acute alc hep. MDF>32 although labs overall improving with supportive care. Please obtain broad infectious workup in case steroids are need although suspect that labs will normalize in the next 2-3 days. Infectious work up neg so far.   - Liver enzymes have been trending down.     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   - US abd showed nodular liver concerning for cirrhosis.   -Volume: improving with low Na diet. Lower ext Doppler negative. No ascites on imaging.   -Infection: see above   -Bleeding: no signs of any bleeding. Non-urgent EGD vs. NSBB   -HE: A/Ox3.    Recommendations:  - Liver enzymes have been trending down, MDF score 27 today so he has good prognosis, would hold off on steroids.   - Chronic liver work up pending.   -Follow up PET   -Thiamine/Folate/MVI  - Patient will need follow up in hepatology clinic in 1-2 weeks after hospital discharge. Will arrange for an appointment.     Discussed the case with Dr. Hinkle.     Hepatology will sign off.     Thank you for the consult. Please call us back if you have any questions or concerns.     All recommendations are tentative until the note is attested by an attending.     Chema Chaney, PGY-5  Gastroenterology/Hepatology Fellow  Available on [employer] Teams  [phone] (Short Range Pager)  [phone] (Long Range Pager)    After 5pm, please contact the on-call GI fellow. [phone]

## 2024-01-23 NOTE — DISCHARGE NOTE NURSING/CASE MANAGEMENT/SOCIAL WORK - PATIENT PORTAL LINK FT
You can access the FollowMyHealth Patient Portal offered by [facility] by registering at the following website: [email or web address]. By joining [employer]’s FollowMyHealth portal, you will also be able to view your health information using other applications (apps) compatible with our system.

## 2024-01-24 LAB
ANA PAT FLD IF-IMP: ABNORMAL
ANA TITR SER: ABNORMAL

## 2024-01-25 LAB
MITOCHONDRIA AB SER-ACNC: SIGNIFICANT CHANGE UP
PETH 16:0/18:1: >400 NG/ML — HIGH
PETH 16:0/18:2: >400 NG/ML — HIGH
PETH COMMENTS: SIGNIFICANT CHANGE UP
SMOOTH MUSCLE AB SER-ACNC: ABNORMAL

## 2024-01-25 NOTE — CHART NOTE - NSCHARTNOTEFT_GEN_A_CORE
Patient was outreached but did not answer. A voicemail was left for the patient to return our call. mailbox not st up @ [phone], left vm for pt sister @ [phone]

## 2024-01-27 LAB
CULTURE RESULTS: SIGNIFICANT CHANGE UP
CULTURE RESULTS: SIGNIFICANT CHANGE UP
SPECIMEN SOURCE: SIGNIFICANT CHANGE UP
SPECIMEN SOURCE: SIGNIFICANT CHANGE UP

## 2024-02-08 PROBLEM — Z00.00 ENCOUNTER FOR PREVENTIVE HEALTH EXAMINATION: Status: ACTIVE | Noted: 2024-02-08

## 2024-02-09 ENCOUNTER — APPOINTMENT (OUTPATIENT)
Dept: HEPATOLOGY | Facility: CLINIC | Age: 54
End: 2024-02-09
Payer: COMMERCIAL

## 2024-02-09 VITALS
DIASTOLIC BLOOD PRESSURE: 76 MMHG | WEIGHT: 206 LBS | BODY MASS INDEX: 27.9 KG/M2 | SYSTOLIC BLOOD PRESSURE: 128 MMHG | HEART RATE: 86 BPM | TEMPERATURE: 97.4 F | OXYGEN SATURATION: 98 % | HEIGHT: 72 IN

## 2024-02-09 PROCEDURE — 99214 OFFICE O/P EST MOD 30 MIN: CPT

## 2024-02-09 NOTE — REASON FOR VISIT
[Post Hospitalization] : a post hospitalization visit [FreeTextEntry1] : Alcohol associated liver cirrhosis.

## 2024-02-09 NOTE — ASSESSMENT
[FreeTextEntry1] : Reji Ramos is a 53 year old gentleman with a medical history of alcohol use d/o and recent diagnosis of alcohol associated liver cirrhosis who was admitted at [facility] in January after he presented with tremors and was noted to have elevated liver enzymes presumed due to alcoholic hepatitis here for post discharge follow up. DF> 32 but numbers improved with supportive care as so no prednisone given. CLD work up was unrevealing while hospitalized.  Doing well. No clear signs of decompensation.  Spent a considerable amount of time discussing his diagnosis of liver cirrhosis and education about the natural course of liver cirrhosis -compensated versus decompensated, MELD score and its use. Advised on the importance of sobriety.  He tells me that he has no cravings.  I advised that we have medications that can help if he does develop cravings. Congratulated him on his 2 weeks of sobriety. Will obtain MELD labs today. PETH >400 while hospitalized 2 weeks ago.  HE: None on exam. Ascites: None on exam HCC: none noted on imaging completed while inpatient - US and CT abdomen. Will check AFP today. EVB: none in the past. Needs EGD for variceal screening given his low plts (surrogate for CSPH). No NSAIDs. Can use up to 2gm/day of tylenol. Discussed the importance of healthy lifestyle for the liver healthy.  RTO in 3 months.

## 2024-02-09 NOTE — PHYSICAL EXAM
[General Appearance - Alert] : alert [General Appearance - In No Acute Distress] : in no acute distress [Sclera] : the sclera and conjunctiva were normal [Extraocular Movements] : extraocular movements were intact [Auscultation Breath Sounds / Voice Sounds] : lungs were clear to auscultation bilaterally [Heart Rate And Rhythm] : heart rate was normal and rhythm regular [Heart Sounds] : normal S1 and S2 [Bowel Sounds] : normal bowel sounds [Abdomen Soft] : soft [Abdomen Tenderness] : non-tender [Abnormal Walk] : normal gait [Nail Clubbing] : no clubbing  or cyanosis of the fingernails [Musculoskeletal - Swelling] : no joint swelling seen [Skin Color & Pigmentation] : normal skin color and pigmentation [Skin Turgor] : normal skin turgor [] : no rash [Oriented To Time, Place, And Person] : oriented to person, place, and time [Impaired Insight] : insight and judgment were intact [Affect] : the affect was normal [FreeTextEntry1] : Tanned

## 2024-02-09 NOTE — HISTORY OF PRESENT ILLNESS
[FreeTextEntry1] : Reji Ramos is a 53 year old gentleman with a medical history of alcohol use d/o and alcohol associated liver cirrhosis who was admitted iat [facility] n January after he presented with tremors and  was noted to have elevated liver enzymes presumed due to alcoholic hepatitis here for post discharge follow up. DF> 32 but numbers improved with supportive care as so no prednisone given. Infectious work up was negative as well.   Has done well since discharge.  He offers no complaints at today's visit.  Last alcohol use was a week before his presentation to the hospital.  He tells me that that was his first liver cirrhosis..  He had actually presented to the hospital because of upper respiratory symptoms and shaky legs.  He thought he had COVID he was found to have the flu.   He has been drinking age 19.  Started to drink heavily when he started to work as a  around 2006.  Drank mostly due to his environment.  Drank about half a bottle of vodka or 2 bottles of wine daily plus or minus. Denies any known history of anxiety or depression.  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  Now sober for 3 weeks now. Saturday before coming to the hospital.  Denies any signs of craving although reports sleep disturbances. Started to drink at age 19. 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  Lives alone, , kid is 29 (healthy) -lives in [place] but [place]. No abdominal surgeries No known medical history Does not take any prescription medications at the moment. Dad with well controlled DM, Mom is healthy.  Weighed 260lbs in the past (over a year ago). Now lost 60lbs by eating healthy and exercising.

## 2024-02-12 LAB
ALBUMIN SERPL ELPH-MCNC: 3.5 G/DL
ALP BLD-CCNC: 97 U/L
ALPHA-1-FETOPROTEIN-L3: 10.6 %
ALPHA-1-FETOPROTEIN: 7.7 NG/ML
ALT SERPL-CCNC: 34 U/L
ANION GAP SERPL CALC-SCNC: 9 MMOL/L
AST SERPL-CCNC: 79 U/L
BILIRUB SERPL-MCNC: 1.6 MG/DL
BUN SERPL-MCNC: 14 MG/DL
CALCIUM SERPL-MCNC: 9.2 MG/DL
CHLORIDE SERPL-SCNC: 103 MMOL/L
CO2 SERPL-SCNC: 23 MMOL/L
CREAT SERPL-MCNC: 0.74 MG/DL
EGFR: 108 ML/MIN/1.73M2
FOLATE SERPL-MCNC: 17.1 NG/ML
GLUCOSE SERPL-MCNC: 101 MG/DL
HCT VFR BLD CALC: 32.6 %
HGB BLD-MCNC: 10.8 G/DL
INR PPP: 1.46 RATIO
MCHC RBC-ENTMCNC: 33.1 GM/DL
MCHC RBC-ENTMCNC: 33.2 PG
MCV RBC AUTO: 100.3 FL
PLATELET # BLD AUTO: 148 K/UL
POTASSIUM SERPL-SCNC: 4.4 MMOL/L
PROT SERPL-MCNC: 8.3 G/DL
PT BLD: 16.3 SEC
RBC # BLD: 3.25 M/UL
RBC # FLD: 16.9 %
SODIUM SERPL-SCNC: 135 MMOL/L
VIT B12 SERPL-MCNC: 1070 PG/ML
WBC # FLD AUTO: 10.36 K/UL

## 2024-02-22 RX ORDER — POLYETHYLENE GLYCOL 3350 AND ELECTROLYTES WITH LEMON FLAVOR 236; 22.74; 6.74; 5.86; 2.97 G/4L; G/4L; G/4L; G/4L; G/4L
236 POWDER, FOR SOLUTION ORAL
Qty: 1 | Refills: 0 | Status: ACTIVE | COMMUNITY
Start: 2024-02-22 | End: 1900-01-01

## 2024-02-27 ENCOUNTER — OUTPATIENT (OUTPATIENT)
Dept: OUTPATIENT SERVICES | Facility: HOSPITAL | Age: 54
LOS: 1 days | Discharge: HOME | End: 2024-02-27
Payer: COMMERCIAL

## 2024-02-27 ENCOUNTER — TRANSCRIPTION ENCOUNTER (OUTPATIENT)
Age: 54
End: 2024-02-27

## 2024-02-27 ENCOUNTER — APPOINTMENT (OUTPATIENT)
Dept: HEPATOLOGY | Facility: HOSPITAL | Age: 54
End: 2024-02-27
Payer: COMMERCIAL

## 2024-02-27 ENCOUNTER — RESULT REVIEW (OUTPATIENT)
Age: 54
End: 2024-02-27

## 2024-02-27 VITALS
RESPIRATION RATE: 19 BRPM | HEIGHT: 74 IN | OXYGEN SATURATION: 100 % | WEIGHT: 207.01 LBS | TEMPERATURE: 99 F | DIASTOLIC BLOOD PRESSURE: 53 MMHG | SYSTOLIC BLOOD PRESSURE: 113 MMHG | HEART RATE: 77 BPM

## 2024-02-27 VITALS
HEART RATE: 69 BPM | SYSTOLIC BLOOD PRESSURE: 115 MMHG | RESPIRATION RATE: 18 BRPM | OXYGEN SATURATION: 100 % | DIASTOLIC BLOOD PRESSURE: 56 MMHG

## 2024-02-27 DIAGNOSIS — F10.90 ALCOHOL USE, UNSPECIFIED, UNCOMPLICATED: ICD-10-CM

## 2024-02-27 PROCEDURE — 88342 IMHCHEM/IMCYTCHM 1ST ANTB: CPT | Mod: 26

## 2024-02-27 PROCEDURE — 45380 COLONOSCOPY AND BIOPSY: CPT

## 2024-02-27 PROCEDURE — 88305 TISSUE EXAM BY PATHOLOGIST: CPT | Mod: 26

## 2024-02-27 DEVICE — CLIP RESOLUTION 360 ULTRA 235CM 20/BX: Type: IMPLANTABLE DEVICE | Status: FUNCTIONAL

## 2024-02-27 RX ORDER — SODIUM CHLORIDE 9 MG/ML
500 INJECTION INTRAMUSCULAR; INTRAVENOUS; SUBCUTANEOUS
Refills: 0 | Status: DISCONTINUED | OUTPATIENT
Start: 2024-02-27 | End: 2024-03-13

## 2024-02-27 NOTE — ASU DISCHARGE PLAN (ADULT/PEDIATRIC) - CARE PROVIDER_API CALL
Becky Hinkle  Transplant Hepatology  [address] [place] [id number]  Phone: [phone]  Fax: [phone]  Established Patient  Follow Up Time:

## 2024-02-27 NOTE — ASU DISCHARGE PLAN (ADULT/PEDIATRIC) - NS MD DC FALL RISK RISK
For information on Fall & Injury Prevention, visit: [email or web address] OR  [email or web address] OR  https://www.cdc.gov/steadi/patient.html

## 2024-02-27 NOTE — ASU PREOP CHECKLIST - TEMPERATURE IN CELSIUS (DEGREES C)
Patient Information     Patient Name MRN Sex Annie Ram [id number] Female 1939      Progress Notes by Florentin Cordoba MD at 2018  2:00 PM     Author:  Florentin Cordoba MD Service:  (none) Author Type:  Physician     Filed:  2018  2:20 PM Encounter Date:  2018 Status:  Signed     :  Florentin Cordoba MD (Physician)            Preprocedure diagnosis  History of bladder cancer    Postprocedure diagnosis  History of bladder cancer    Procedure  Flexible Cystourethroscopy    Surgeon  Florentin Cordoba MD    Anesthesia  2% lidocaine jelly intraurethrally    Complications  None    Indications  78 y.o. female undergoing a flexible cystoscopy for the above mentioned indications.    Findings  Cystoscopic findings included a normal urethra.    The bladder appeared to be normal capacity.    There were no tumors, stones or foreign bodies.    The orifices were slit-shaped and in their normal location.    Procedure  The patient was placed in supine position and prepped and draped in sterile fashion with lidocaine jelly per urethra for anesthesia.    I passed a lubricated 14F flexible cystoscope through the urethra and into the bladder and the bladder was completely visualized.  The cystoscope was retroflexed and the bladder neck visualized.    The cystoscope was slowly withdrawn while visualizing the urethra and the procedure terminated.    The patient tolerated the procedure well.      Pathology  I personally reviewed the pathology report  2015  low grade Ta (muscularis propria was not present in specimen)    Plan  Follow up surveillance cystoscopy once annually         37.1

## 2024-02-27 NOTE — PRE PROCEDURE NOTE - PRE PROCEDURE EVALUATION
53 year old gentleman with a medical history of alcohol use d/o and alcohol associated liver cirrhosis here for Variceal screening and colon cancer screening.

## 2024-02-27 NOTE — ASU PATIENT PROFILE, ADULT - FALL HARM RISK - UNIVERSAL INTERVENTIONS
Bed in lowest position, wheels locked, appropriate side rails in place/Call bell, personal items and telephone in reach/Instruct patient to call for assistance before getting out of bed or chair/Non-slip footwear when patient is out of bed/[place]ian to call system/Physically safe environment - no spills, clutter or unnecessary equipment/Purposeful Proactive Rounding/Room/bathroom lighting operational, light cord in reach

## 2024-02-28 DIAGNOSIS — K22.70 BARRETT'S ESOPHAGUS W/OUT DYSPLASIA: ICD-10-CM

## 2024-02-28 DIAGNOSIS — I85.10 SECONDARY ESOPHAGEAL VARICES W/OUT BLEEDING: ICD-10-CM

## 2024-02-28 RX ORDER — PANTOPRAZOLE SODIUM 40 MG/1
40 GRANULE, DELAYED RELEASE ORAL
Qty: 30 | Refills: 5 | Status: ACTIVE | COMMUNITY
Start: 2024-02-28 | End: 1900-01-01

## 2024-02-28 RX ORDER — CARVEDILOL 3.12 MG/1
3.12 TABLET, FILM COATED ORAL
Qty: 180 | Refills: 0 | Status: ACTIVE | COMMUNITY
Start: 2024-02-28 | End: 1900-01-01

## 2024-03-01 LAB — SURGICAL PATHOLOGY STUDY: SIGNIFICANT CHANGE UP

## 2024-03-16 NOTE — ED ADULT TRIAGE NOTE - BP NONINVASIVE DIASTOLIC (MM HG)
68
Simple: Patient demonstrates quick and easy understanding/Patient asked questions/Verbalized Understanding

## 2024-05-13 ENCOUNTER — APPOINTMENT (OUTPATIENT)
Dept: HEPATOLOGY | Facility: CLINIC | Age: 54
End: 2024-05-13
Payer: COMMERCIAL

## 2024-05-13 VITALS
BODY MASS INDEX: 28.44 KG/M2 | OXYGEN SATURATION: 98 % | TEMPERATURE: 97.6 F | DIASTOLIC BLOOD PRESSURE: 65 MMHG | HEIGHT: 72 IN | WEIGHT: 210 LBS | SYSTOLIC BLOOD PRESSURE: 122 MMHG | HEART RATE: 82 BPM

## 2024-05-13 DIAGNOSIS — F10.90 ALCOHOL USE, UNSPECIFIED, UNCOMPLICATED: ICD-10-CM

## 2024-05-13 DIAGNOSIS — K70.10 ALCOHOLIC HEPATITIS W/OUT ASCITES: ICD-10-CM

## 2024-05-13 DIAGNOSIS — K70.9 ALCOHOLIC LIVER DISEASE, UNSPECIFIED: ICD-10-CM

## 2024-05-13 DIAGNOSIS — R04.0 EPISTAXIS: ICD-10-CM

## 2024-05-13 PROCEDURE — 99214 OFFICE O/P EST MOD 30 MIN: CPT

## 2024-05-13 RX ORDER — CARVEDILOL 3.12 MG/1
3.12 TABLET, FILM COATED ORAL TWICE DAILY
Qty: 60 | Refills: 5 | Status: ACTIVE | COMMUNITY
Start: 2024-05-13 | End: 1900-01-01

## 2024-05-13 RX ORDER — OMEPRAZOLE 40 MG/1
40 CAPSULE, DELAYED RELEASE ORAL
Qty: 30 | Refills: 5 | Status: ACTIVE | COMMUNITY
Start: 2024-05-13 | End: 1900-01-01

## 2024-05-13 NOTE — HISTORY OF PRESENT ILLNESS
[FreeTextEntry1] : Reji Ramos is a 53 year old gentleman with a medical history of alcohol use d/o and alcohol associated liver cirrhosis who was admitted with his first liver related decompensation at [facility] in January, 2023. He had presented with tremors and was noted to have elevated liver enzymes presumed due to alcoholic hepatitis. DF> 32 but numbers improved with supportive care as so no prednisone given..   Initial visit with me was in Feb, 2024.  Has done well since discharge. Last alcohol use was a week before his presentation to the hospital. Has done well since discharge. He offers no complaints at today's visit. Last alcohol use was a week before his presentation to the hospital. While hospitalized, he was found to have the flu.   Today, he offers no complaints and appears to be doing well. Remains sober. Last alcohol use was in Jan, 2024. Denies cravings.  Only complaint is intermittent epistaxis - almost weekly & intermittent ankle swelling when he sits for a long period of time. Denies any abdominal pain, melena, hematemesis, jaundice. He has good energy and is happier now that he is sober. Misses his wine with meals but has no cravings.  Is working and now looking for another job. Planning to visit [facility] this summer.    ------------------------------------------------------------------------------------------------------------------------------------------------------------------------------ He has been drinking age 19. Started to drink heavily when he started to work as a  around 2006. Drank mostly due to his environment. Drank about half a bottle of vodka or 2 bottles of wine daily plus or minus. Denies any known history of anxiety or depression. 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 Lives alone, , kid is 29 (healthy) -lives in [place] but is planning to move back north. No abdominal surgeries No known medical history Does not take any prescription medications at the moment. Dad with well controlled DM, Mom is healthy.  Weighed 260lbs in the past (over a year ago). Now lost 60lbs by eating healthy and exercising.

## 2024-05-13 NOTE — PHYSICAL EXAM
[General Appearance - Alert] : alert [General Appearance - In No Acute Distress] : in no acute distress [Sclera] : the sclera and conjunctiva were normal [Extraocular Movements] : extraocular movements were intact [Auscultation Breath Sounds / Voice Sounds] : lungs were clear to auscultation bilaterally [Heart Rate And Rhythm] : heart rate was normal and rhythm regular [Heart Sounds] : normal S1 and S2 [Bowel Sounds] : normal bowel sounds [Abdomen Soft] : soft [Abdomen Tenderness] : non-tender [Abnormal Walk] : normal gait [Nail Clubbing] : no clubbing  or cyanosis of the fingernails [Musculoskeletal - Swelling] : no joint swelling seen [Skin Color & Pigmentation] : normal skin color and pigmentation [Skin Turgor] : normal skin turgor [] : no rash [FreeTextEntry1] : Tanned  [Oriented To Time, Place, And Person] : oriented to person, place, and time [Impaired Insight] : insight and judgment were intact [Affect] : the affect was normal

## 2024-05-13 NOTE — ASSESSMENT
[FreeTextEntry1] : Reji Ramos is a 53 year old gentleman with a medical history of alcohol use d/o and recent diagnosis of alcohol associated liver cirrhosis who was admitted at [facility] in January 2024 after he presented with tremors and was noted to have elevated liver enzymes presumed due to alcoholic hepatitis here for post discharge follow up. DF> 32 but numbers improved with supportive care as so no steroid given. CLD work up was unrevealing while hospitalized.  Doing well. No clear signs of decompensation. Low MELD using labs from last visit.  I congratulated him on his continued sobriety and advised that I will continue to monitor him with serial PETH tests which he is agreeable. He denies cravings. I advised that we have medications that can help if he does develop cravings. I spent a considerable amount of time discussing his diagnosis of liver cirrhosis and education about the natural course of liver cirrhosis -compensated versus decompensated, MELD score and its use. Repeat MELD labs today. PETH today  HE: None on exam. Ascites: None on exam HCC: none noted on imaging completed while inpatient. US for HCC screening ordered. AFP normal from Feb, 2024 EVB: on EGD grade 2 varices seen. Will start on Coreg 3.125 BID. I have advised that he monitor his BP daily once he starts. If BP too low, will d/c and try an alternative. Also noted to have BE on EGD. No biopsies taken due to the presence of varices. Started him on omeprazole 40mg daily. No NSAIDs. Can use up to 2gm/day of tylenol. Discussed the importance of healthy lifestyle for the liver healthy. Had his first Colonoscopy - 5polyps seen with diverticulosis. Needs repeat colonoscopy in 3 years given number of polyps and fair prep. Advised re: increasing his fiber intake.  RTO in 3 months.

## 2024-05-16 LAB
ALBUMIN SERPL ELPH-MCNC: 3.4 G/DL
ALP BLD-CCNC: 119 U/L
ALT SERPL-CCNC: 29 U/L
ANION GAP SERPL CALC-SCNC: 9 MMOL/L
AST SERPL-CCNC: 57 U/L
BILIRUB SERPL-MCNC: 1.6 MG/DL
BUN SERPL-MCNC: 15 MG/DL
CALCIUM SERPL-MCNC: 9 MG/DL
CHLORIDE SERPL-SCNC: 108 MMOL/L
CO2 SERPL-SCNC: 22 MMOL/L
CREAT SERPL-MCNC: 0.68 MG/DL
EGFR: 111 ML/MIN/1.73M2
FERRITIN SERPL-MCNC: 92 NG/ML
GLUCOSE SERPL-MCNC: 98 MG/DL
HCT VFR BLD CALC: 27.9 %
HGB BLD-MCNC: 9.4 G/DL
INR PPP: 1.49 RATIO
IRON SATN MFR SERPL: 37 %
IRON SERPL-MCNC: 118 UG/DL
MCHC RBC-ENTMCNC: 33.7 GM/DL
MCHC RBC-ENTMCNC: 33.9 PG
MCV RBC AUTO: 100.7 FL
PETH 16:0/18:1: NEGATIVE NG/ML
PETH 16:0/18:2: NEGATIVE NG/ML
PETH COMMENTS: NORMAL
PLATELET # BLD AUTO: 74 K/UL
POTASSIUM SERPL-SCNC: 4.1 MMOL/L
PROT SERPL-MCNC: 7.6 G/DL
PT BLD: 16.6 SEC
RBC # BLD: 2.77 M/UL
RBC # FLD: 15.7 %
SODIUM SERPL-SCNC: 139 MMOL/L
TIBC SERPL-MCNC: 319 UG/DL
UIBC SERPL-MCNC: 202 UG/DL
WBC # FLD AUTO: 5.2 K/UL

## 2024-05-20 LAB
ALPHA-1-FETOPROTEIN-L3: 9 %
ALPHA-1-FETOPROTEIN: 6.8 NG/ML

## 2024-06-14 ENCOUNTER — OFFICE VISIT (OUTPATIENT)
Dept: URBAN - METROPOLITAN AREA CLINIC 89 | Facility: CLINIC | Age: 54
End: 2024-06-14
Payer: COMMERCIAL

## 2024-06-14 DIAGNOSIS — H52.13 MYOPIA, BILATERAL: ICD-10-CM

## 2024-06-14 DIAGNOSIS — E11.3293 DIABETES MELLITUS TYPE 2 WITH MILD NON-PROLIFERATIVE RETINOPATHY WITHOUT MACULAR EDEMA, BILATERAL: Primary | ICD-10-CM

## 2024-06-14 DIAGNOSIS — H40.023 OPEN ANGLE WITH BORDERLINE FINDINGS, HIGH RISK, BILATERAL: ICD-10-CM

## 2024-06-14 PROCEDURE — 92133 CPTRZD OPH DX IMG PST SGM ON: CPT | Performed by: OPTOMETRIST

## 2024-06-14 PROCEDURE — 92014 COMPRE OPH EXAM EST PT 1/>: CPT | Performed by: OPTOMETRIST

## 2024-06-14 ASSESSMENT — INTRAOCULAR PRESSURE
OD: 14
OS: 14

## 2024-06-17 RX ORDER — FUROSEMIDE 20 MG/1
20 TABLET ORAL DAILY
Qty: 30 | Refills: 1 | Status: ACTIVE | COMMUNITY
Start: 2024-06-17 | End: 1900-01-01

## 2024-08-13 ENCOUNTER — RX RENEWAL (OUTPATIENT)
Age: 54
End: 2024-08-13

## 2024-08-19 ENCOUNTER — APPOINTMENT (OUTPATIENT)
Dept: HEPATOLOGY | Facility: CLINIC | Age: 54
End: 2024-08-19
Payer: COMMERCIAL

## 2024-08-19 VITALS
WEIGHT: 211 LBS | OXYGEN SATURATION: 100 % | DIASTOLIC BLOOD PRESSURE: 68 MMHG | RESPIRATION RATE: 16 BRPM | BODY MASS INDEX: 28.58 KG/M2 | HEART RATE: 77 BPM | SYSTOLIC BLOOD PRESSURE: 130 MMHG | HEIGHT: 72 IN

## 2024-08-19 DIAGNOSIS — I85.10 SECONDARY ESOPHAGEAL VARICES W/OUT BLEEDING: ICD-10-CM

## 2024-08-19 DIAGNOSIS — F10.90 ALCOHOL USE, UNSPECIFIED, UNCOMPLICATED: ICD-10-CM

## 2024-08-19 DIAGNOSIS — K70.9 ALCOHOLIC LIVER DISEASE, UNSPECIFIED: ICD-10-CM

## 2024-08-19 DIAGNOSIS — K22.70 BARRETT'S ESOPHAGUS W/OUT DYSPLASIA: ICD-10-CM

## 2024-08-19 PROCEDURE — 99214 OFFICE O/P EST MOD 30 MIN: CPT

## 2024-08-19 NOTE — HISTORY OF PRESENT ILLNESS
[FreeTextEntry1] : Reji Ramos is a 53 year old gentleman with a medical history of alcohol use d/o and alcohol associated liver cirrhosis who was admitted with his first liver related decompensation at [facility] in January, 2024. He had presented with tremors and was noted to have elevated liver enzymes presumed due to alcoholic hepatitis. DF> 32 but numbers improved with supportive care as so no prednisone given.     He is here for follow up today, offers no complaints and appears to be doing well. Travelled to [facility] for 3 weeks and denies any alcohol use despite his travels. Last alcohol use was in January 2024. Denies cravings. Continues to have lower extremity swelling although has not been compliant with low salt intake during his vacation. Hoping to get back on track. Denies any abdominal pain, melena, hematemesis, jaundice. Unclear what meds he taking. Is supposed to be taking Furosemide 20mg, Coreg 3.125mg BID, Omeprazole for BE.    ---------------------------------------------------------------------------------------------------------------------------- Started to drink alcohol at age 19. Started to drink heavily when he started to work as a  around 2006. Drank mostly due to his environment. Drank about half a bottle of vodka or 2 bottles of wine daily plus or minus. Denies any known history of anxiety or depression. 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 Lives alone, , kid is 29 (healthy) -lives in [place] but is planning to move back north. No abdominal surgeries No known medical history Does not take any prescription medications at the moment. Dad with well controlled DM, Mom is healthy.

## 2024-08-19 NOTE — PHYSICAL EXAM
[General Appearance - Alert] : alert [General Appearance - In No Acute Distress] : in no acute distress [Sclera] : the sclera and conjunctiva were normal [Auscultation Breath Sounds / Voice Sounds] : lungs were clear to auscultation bilaterally [Heart Rate And Rhythm] : heart rate was normal and rhythm regular [Heart Sounds] : normal S1 and S2 [Bowel Sounds] : normal bowel sounds [Abdomen Soft] : soft [Abdomen Tenderness] : non-tender [Abnormal Walk] : normal gait [Nail Clubbing] : no clubbing  or cyanosis of the fingernails [Musculoskeletal - Swelling] : no joint swelling seen [Skin Color & Pigmentation] : normal skin color and pigmentation [Skin Turgor] : normal skin turgor [] : no rash [Oriented To Time, Place, And Person] : oriented to person, place, and time [Impaired Insight] : insight and judgment were intact [Affect] : the affect was normal [FreeTextEntry1] : Tanned

## 2024-08-19 NOTE — ASSESSMENT
[FreeTextEntry1] : Reji Ramos is a 53 year old gentleman with a medical history of alcohol use d/o and recent diagnosis of alcohol associated liver cirrhosis here for follow up.  He was admitted at [facility] in January 2024 after he presented with tremors and was noted to have elevated liver enzymes presumed due to alcoholic hepatitis. DF> 32 but numbers improved with supportive care as so no steroid given. CLD work up was unrevealing while hospitalized.  Doing well. No clear signs of decompensation.   I congratulated him on his continued sobriety and advised that I will continue to monitor him with serial PETH tests which he is agreeable. He denies cravings. I advised that we have medications that can help if he does develop cravings. I spent a considerable amount of time discussing his diagnosis of liver cirrhosis and education about the natural course of liver cirrhosis -compensated versus decompensated, MELD score and its use. I will repeat MELD labs & PETH today.  HE: None on exam. Ascites: None on exam. Lower Ext swelling is present - on lasix 20mg, will increase to 40mg. I advised on the importance of low salt intake. HCC: none noted on imaging completed while inpatient. US for HCC screening ordered at last visit but has not been completed. Advised to get it scheduled ASAP. EVB: on EGD grade 2 varices seen. Continue Coreg 3.125 BID. Will increase to 6.25mg BID if BP tolerates. 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 No biopsies taken due to the presence of varices. Continue on omeprazole 40mg daily. No NSAIDs. Can use up to 2gm/day of tylenol. Discussed the importance of healthy lifestyle for the liver healthy. Had his first Colonoscopy - 5polyps seen with diverticulosis. Needs repeat colonoscopy in 3 years given number of polyps and fair prep. Advised re: increasing his fiber intake.  RTO in 3 months.

## 2024-08-22 ENCOUNTER — OUTPATIENT (OUTPATIENT)
Dept: OUTPATIENT SERVICES | Facility: HOSPITAL | Age: 54
LOS: 1 days | End: 2024-08-22
Payer: COMMERCIAL

## 2024-08-22 ENCOUNTER — APPOINTMENT (OUTPATIENT)
Dept: ULTRASOUND IMAGING | Facility: IMAGING CENTER | Age: 54
End: 2024-08-22
Payer: COMMERCIAL

## 2024-08-22 DIAGNOSIS — K70.10 ALCOHOLIC HEPATITIS WITHOUT ASCITES: ICD-10-CM

## 2024-08-22 DIAGNOSIS — K70.9 ALCOHOLIC LIVER DISEASE, UNSPECIFIED: ICD-10-CM

## 2024-08-22 PROCEDURE — 76700 US EXAM ABDOM COMPLETE: CPT

## 2024-08-22 PROCEDURE — 76700 US EXAM ABDOM COMPLETE: CPT | Mod: 26

## 2024-08-23 LAB
ALBUMIN SERPL ELPH-MCNC: 3.4 G/DL
ALP BLD-CCNC: 125 U/L
ALT SERPL-CCNC: 22 U/L
ANION GAP SERPL CALC-SCNC: 13 MMOL/L
AST SERPL-CCNC: 39 U/L
BILIRUB SERPL-MCNC: 1.4 MG/DL
BUN SERPL-MCNC: 12 MG/DL
CALCIUM SERPL-MCNC: 9.3 MG/DL
CHLORIDE SERPL-SCNC: 108 MMOL/L
CO2 SERPL-SCNC: 20 MMOL/L
CREAT SERPL-MCNC: 0.73 MG/DL
EGFR: 109 ML/MIN/1.73M2
GLUCOSE SERPL-MCNC: 101 MG/DL
HCT VFR BLD CALC: 26.2 %
HGB BLD-MCNC: 8.5 G/DL
INR PPP: 1.45 RATIO
MCHC RBC-ENTMCNC: 32.4 GM/DL
MCHC RBC-ENTMCNC: 32.8 PG
MCV RBC AUTO: 101.2 FL
PETH 16:0/18:1: NEGATIVE NG/ML
PETH 16:0/18:2: NEGATIVE NG/ML
PETH COMMENTS: NORMAL
PLATELET # BLD AUTO: 80 K/UL
POTASSIUM SERPL-SCNC: 4 MMOL/L
PROT SERPL-MCNC: 7.1 G/DL
PT BLD: 16.2 SEC
RBC # BLD: 2.59 M/UL
RBC # FLD: 15.1 %
SODIUM SERPL-SCNC: 142 MMOL/L
WBC # FLD AUTO: 5.33 K/UL

## 2024-10-08 ENCOUNTER — APPOINTMENT (OUTPATIENT)
Dept: OTOLARYNGOLOGY | Facility: CLINIC | Age: 54
End: 2024-10-08

## 2024-10-12 ENCOUNTER — RX RENEWAL (OUTPATIENT)
Age: 54
End: 2024-10-12

## 2024-11-04 ENCOUNTER — APPOINTMENT (OUTPATIENT)
Dept: HEPATOLOGY | Facility: CLINIC | Age: 54
End: 2024-11-04
Payer: COMMERCIAL

## 2024-11-04 VITALS
WEIGHT: 211 LBS | HEART RATE: 69 BPM | OXYGEN SATURATION: 97 % | RESPIRATION RATE: 16 BRPM | TEMPERATURE: 98.3 F | SYSTOLIC BLOOD PRESSURE: 132 MMHG | BODY MASS INDEX: 28.58 KG/M2 | HEIGHT: 72 IN | DIASTOLIC BLOOD PRESSURE: 60 MMHG

## 2024-11-04 DIAGNOSIS — F10.90 ALCOHOL USE, UNSPECIFIED, UNCOMPLICATED: ICD-10-CM

## 2024-11-04 DIAGNOSIS — K70.9 ALCOHOLIC LIVER DISEASE, UNSPECIFIED: ICD-10-CM

## 2024-11-04 DIAGNOSIS — K70.10 ALCOHOLIC HEPATITIS W/OUT ASCITES: ICD-10-CM

## 2024-11-04 DIAGNOSIS — K22.70 BARRETT'S ESOPHAGUS W/OUT DYSPLASIA: ICD-10-CM

## 2024-11-04 DIAGNOSIS — I85.10 SECONDARY ESOPHAGEAL VARICES W/OUT BLEEDING: ICD-10-CM

## 2024-11-04 PROCEDURE — 99214 OFFICE O/P EST MOD 30 MIN: CPT

## 2024-11-04 RX ORDER — FUROSEMIDE 20 MG/1
20 TABLET ORAL
Qty: 30 | Refills: 0 | Status: ACTIVE | COMMUNITY
Start: 2024-11-04 | End: 1900-01-01

## 2024-11-04 RX ORDER — CARVEDILOL 6.25 MG/1
6.25 TABLET, FILM COATED ORAL TWICE DAILY
Qty: 60 | Refills: 3 | Status: ACTIVE | COMMUNITY
Start: 2024-11-04 | End: 1900-01-01

## 2024-11-07 ENCOUNTER — APPOINTMENT (OUTPATIENT)
Dept: OTOLARYNGOLOGY | Facility: CLINIC | Age: 54
End: 2024-11-07
Payer: COMMERCIAL

## 2024-11-07 VITALS — BODY MASS INDEX: 27.08 KG/M2 | HEIGHT: 74 IN | WEIGHT: 211 LBS

## 2024-11-07 DIAGNOSIS — J34.89 OTHER SPECIFIED DISORDERS OF NOSE AND NASAL SINUSES: ICD-10-CM

## 2024-11-07 DIAGNOSIS — R04.0 EPISTAXIS: ICD-10-CM

## 2024-11-07 PROCEDURE — 31237 NSL/SINS NDSC SURG BX POLYPC: CPT | Mod: LT

## 2024-11-07 PROCEDURE — 99204 OFFICE O/P NEW MOD 45 MIN: CPT | Mod: 25

## 2024-11-08 LAB
BASOPHILS # BLD AUTO: 0.07 K/UL
BASOPHILS NFR BLD AUTO: 1 %
EOSINOPHIL # BLD AUTO: 0.36 K/UL
EOSINOPHIL NFR BLD AUTO: 5.1 %
HCT VFR BLD CALC: 28.5 %
HGB BLD-MCNC: 9.2 G/DL
IMM GRANULOCYTES NFR BLD AUTO: 0.1 %
LYMPHOCYTES # BLD AUTO: 3.85 K/UL
LYMPHOCYTES NFR BLD AUTO: 54.5 %
MAN DIFF?: NORMAL
MCHC RBC-ENTMCNC: 29.5 PG
MCHC RBC-ENTMCNC: 32.3 G/DL
MCV RBC AUTO: 91.3 FL
MONOCYTES # BLD AUTO: 0.69 K/UL
MONOCYTES NFR BLD AUTO: 9.8 %
NEUTROPHILS # BLD AUTO: 2.08 K/UL
NEUTROPHILS NFR BLD AUTO: 29.5 %
PETH 16:0/18:1: NEGATIVE NG/ML
PETH 16:0/18:2: NEGATIVE NG/ML
PETH COMMENTS: NORMAL
PLATELET # BLD AUTO: 86 K/UL
RBC # BLD: 3.12 M/UL
RBC # FLD: 16.3 %
WBC # FLD AUTO: 7.06 K/UL

## 2024-11-13 LAB
ALPHA-1-FETOPROTEIN-L3: 7.3 %
ALPHA-1-FETOPROTEIN: 5.6 NG/ML
CORE LAB BIOPSY: NORMAL

## 2024-11-14 ENCOUNTER — APPOINTMENT (OUTPATIENT)
Dept: CT IMAGING | Facility: IMAGING CENTER | Age: 54
End: 2024-11-14

## 2024-11-14 ENCOUNTER — OUTPATIENT (OUTPATIENT)
Dept: OUTPATIENT SERVICES | Facility: HOSPITAL | Age: 54
LOS: 1 days | End: 2024-11-14
Payer: COMMERCIAL

## 2024-11-14 DIAGNOSIS — J34.89 OTHER SPECIFIED DISORDERS OF NOSE AND NASAL SINUSES: ICD-10-CM

## 2024-11-14 DIAGNOSIS — Z00.8 ENCOUNTER FOR OTHER GENERAL EXAMINATION: ICD-10-CM

## 2024-11-14 DIAGNOSIS — R04.0 EPISTAXIS: ICD-10-CM

## 2024-11-14 PROCEDURE — 70487 CT MAXILLOFACIAL W/DYE: CPT | Mod: 26

## 2024-11-14 PROCEDURE — 70487 CT MAXILLOFACIAL W/DYE: CPT

## 2024-11-20 ENCOUNTER — NON-APPOINTMENT (OUTPATIENT)
Age: 54
End: 2024-11-20

## 2024-12-04 ENCOUNTER — NON-APPOINTMENT (OUTPATIENT)
Age: 54
End: 2024-12-04

## 2024-12-19 ENCOUNTER — APPOINTMENT (OUTPATIENT)
Dept: OTOLARYNGOLOGY | Facility: CLINIC | Age: 54
End: 2024-12-19

## 2025-02-24 ENCOUNTER — RX RENEWAL (OUTPATIENT)
Age: 55
End: 2025-02-24

## 2025-02-26 ENCOUNTER — APPOINTMENT (OUTPATIENT)
Dept: OTOLARYNGOLOGY | Facility: CLINIC | Age: 55
End: 2025-02-26

## 2025-03-04 ENCOUNTER — APPOINTMENT (OUTPATIENT)
Dept: OTOLARYNGOLOGY | Facility: CLINIC | Age: 55
End: 2025-03-04

## 2025-04-07 ENCOUNTER — APPOINTMENT (OUTPATIENT)
Dept: HEPATOLOGY | Facility: CLINIC | Age: 55
End: 2025-04-07

## 2025-04-14 ENCOUNTER — RX RENEWAL (OUTPATIENT)
Age: 55
End: 2025-04-14

## 2025-05-23 ENCOUNTER — NON-APPOINTMENT (OUTPATIENT)
Age: 55
End: 2025-05-23

## 2025-05-23 ENCOUNTER — APPOINTMENT (OUTPATIENT)
Dept: NEUROSURGERY | Facility: CLINIC | Age: 55
End: 2025-05-23
Payer: OTHER MISCELLANEOUS

## 2025-05-23 VITALS
WEIGHT: 220 LBS | BODY MASS INDEX: 28.23 KG/M2 | SYSTOLIC BLOOD PRESSURE: 101 MMHG | HEIGHT: 74 IN | DIASTOLIC BLOOD PRESSURE: 52 MMHG | OXYGEN SATURATION: 98 % | HEART RATE: 62 BPM

## 2025-05-23 DIAGNOSIS — M54.9 DORSALGIA, UNSPECIFIED: ICD-10-CM

## 2025-05-23 DIAGNOSIS — M54.2 CERVICALGIA: ICD-10-CM

## 2025-05-23 DIAGNOSIS — M54.50 LOW BACK PAIN, UNSPECIFIED: ICD-10-CM

## 2025-05-23 PROCEDURE — 99204 OFFICE O/P NEW MOD 45 MIN: CPT

## 2025-05-23 RX ORDER — METHOCARBAMOL 500 MG/1
500 TABLET, FILM COATED ORAL
Qty: 90 | Refills: 2 | Status: ACTIVE | COMMUNITY
Start: 2025-05-23 | End: 1900-01-01

## 2025-06-20 ENCOUNTER — OFFICE VISIT (OUTPATIENT)
Dept: URBAN - METROPOLITAN AREA CLINIC 89 | Facility: CLINIC | Age: 55
End: 2025-06-20
Payer: COMMERCIAL

## 2025-06-20 DIAGNOSIS — H40.023 OPEN ANGLE WITH BORDERLINE FINDINGS, HIGH RISK, BILATERAL: ICD-10-CM

## 2025-06-20 DIAGNOSIS — H52.13 MYOPIA, BILATERAL: ICD-10-CM

## 2025-06-20 DIAGNOSIS — E11.3293 DIABETES MELLITUS TYPE 2 WITH MILD NON-PROLIFERATIVE RETINOPATHY WITHOUT MACULAR EDEMA, BILATERAL: Primary | ICD-10-CM

## 2025-06-20 DIAGNOSIS — H25.13 AGE-RELATED NUCLEAR CATARACT, BILATERAL: ICD-10-CM

## 2025-06-20 PROCEDURE — 92133 CPTRZD OPH DX IMG PST SGM ON: CPT | Performed by: OPTOMETRIST

## 2025-06-20 PROCEDURE — 92014 COMPRE OPH EXAM EST PT 1/>: CPT | Performed by: OPTOMETRIST

## 2025-06-20 ASSESSMENT — KERATOMETRY
OD: 44.25
OS: 43.70

## 2025-06-20 ASSESSMENT — INTRAOCULAR PRESSURE
OS: 13
OD: 12

## 2025-08-04 ENCOUNTER — APPOINTMENT (OUTPATIENT)
Dept: NEUROSURGERY | Facility: CLINIC | Age: 55
End: 2025-08-04

## 2025-08-04 ENCOUNTER — TRANSCRIPTION ENCOUNTER (OUTPATIENT)
Age: 55
End: 2025-08-04

## 2025-08-04 ENCOUNTER — NON-APPOINTMENT (OUTPATIENT)
Age: 55
End: 2025-08-04

## 2025-08-04 VITALS
SYSTOLIC BLOOD PRESSURE: 108 MMHG | WEIGHT: 220 LBS | HEART RATE: 69 BPM | OXYGEN SATURATION: 97 % | DIASTOLIC BLOOD PRESSURE: 89 MMHG | RESPIRATION RATE: 16 BRPM | HEIGHT: 73 IN | BODY MASS INDEX: 29.16 KG/M2

## 2025-08-05 ENCOUNTER — APPOINTMENT (OUTPATIENT)
Dept: NEUROSURGERY | Facility: CLINIC | Age: 55
End: 2025-08-05
Payer: COMMERCIAL

## 2025-08-05 PROCEDURE — 99212 OFFICE O/P EST SF 10 MIN: CPT | Mod: 93

## (undated) DEVICE — BASIN EMESIS 10IN GRADUATED MAUVE

## (undated) DEVICE — TUBING IV SET GRAVITY 3Y 100" MACRO

## (undated) DEVICE — GOWN LG

## (undated) DEVICE — FORCEP RADIAL JAW 4 JUMBO 2.8MM 3.2MM 240CM ORANGE DISP

## (undated) DEVICE — CATH IV SAFE BC 22G X 1" (BLUE)

## (undated) DEVICE — ELCTR ECG CONDUCTIVE ADHESIVE

## (undated) DEVICE — DRSG BANDAID 0.75X3"

## (undated) DEVICE — DRSG CURITY GAUZE SPONGE 4 X 4" 12-PLY NON-STERILE

## (undated) DEVICE — DRSG 2X2

## (undated) DEVICE — TUBING MEDI-VAC W MAXIGRIP CONNECTORS 1/4"X6'

## (undated) DEVICE — PACK IV START WITH CHG

## (undated) DEVICE — POLY TRAP ETRAP

## (undated) DEVICE — CONTAINER FORMALIN 80ML YELLOW

## (undated) DEVICE — SALIVA EJECTOR (BLUE)

## (undated) DEVICE — BIOPSY FORCEP RADIAL JAW 4 STANDARD WITH NEEDLE

## (undated) DEVICE — BIOPSY FORCEP COLD DISP

## (undated) DEVICE — TUBING SUCTION NONCONDUCTIVE 6MM X 12FT

## (undated) DEVICE — SNARE EXACTO COLD 9MMX230CM

## (undated) DEVICE — ELCTR GROUNDING PAD ADULT COVIDIEN

## (undated) DEVICE — LUBRICATING JELLY HR ONE SHOT 3G